# Patient Record
Sex: FEMALE | Race: WHITE
[De-identification: names, ages, dates, MRNs, and addresses within clinical notes are randomized per-mention and may not be internally consistent; named-entity substitution may affect disease eponyms.]

---

## 2018-11-06 ENCOUNTER — APPOINTMENT (OUTPATIENT)
Dept: FAMILY MEDICINE | Facility: CLINIC | Age: 48
End: 2018-11-06
Payer: COMMERCIAL

## 2018-11-06 ENCOUNTER — TRANSCRIPTION ENCOUNTER (OUTPATIENT)
Age: 48
End: 2018-11-06

## 2018-11-06 ENCOUNTER — NON-APPOINTMENT (OUTPATIENT)
Age: 48
End: 2018-11-06

## 2018-11-06 VITALS
SYSTOLIC BLOOD PRESSURE: 91 MMHG | OXYGEN SATURATION: 98 % | DIASTOLIC BLOOD PRESSURE: 57 MMHG | WEIGHT: 131 LBS | HEIGHT: 62.4 IN | TEMPERATURE: 98.9 F | BODY MASS INDEX: 23.8 KG/M2 | HEART RATE: 56 BPM

## 2018-11-06 DIAGNOSIS — Z81.8 FAMILY HISTORY OF OTHER MENTAL AND BEHAVIORAL DISORDERS: ICD-10-CM

## 2018-11-06 DIAGNOSIS — Z86.59 PERSONAL HISTORY OF OTHER MENTAL AND BEHAVIORAL DISORDERS: ICD-10-CM

## 2018-11-06 DIAGNOSIS — Z13.220 ENCOUNTER FOR SCREENING FOR LIPOID DISORDERS: ICD-10-CM

## 2018-11-06 DIAGNOSIS — Z87.891 PERSONAL HISTORY OF NICOTINE DEPENDENCE: ICD-10-CM

## 2018-11-06 DIAGNOSIS — D64.9 ANEMIA, UNSPECIFIED: ICD-10-CM

## 2018-11-06 DIAGNOSIS — Z13.1 ENCOUNTER FOR SCREENING FOR DIABETES MELLITUS: ICD-10-CM

## 2018-11-06 DIAGNOSIS — Z82.49 FAMILY HISTORY OF ISCHEMIC HEART DISEASE AND OTHER DISEASES OF THE CIRCULATORY SYSTEM: ICD-10-CM

## 2018-11-06 DIAGNOSIS — M54.5 LOW BACK PAIN: ICD-10-CM

## 2018-11-06 DIAGNOSIS — Z87.828 PERSONAL HISTORY OF OTHER (HEALED) PHYSICAL INJURY AND TRAUMA: ICD-10-CM

## 2018-11-06 DIAGNOSIS — Z78.9 OTHER SPECIFIED HEALTH STATUS: ICD-10-CM

## 2018-11-06 DIAGNOSIS — Z82.61 FAMILY HISTORY OF ARTHRITIS: ICD-10-CM

## 2018-11-06 PROCEDURE — 93000 ELECTROCARDIOGRAM COMPLETE: CPT

## 2018-11-06 PROCEDURE — 99386 PREV VISIT NEW AGE 40-64: CPT | Mod: 25

## 2018-11-06 NOTE — HISTORY OF PRESENT ILLNESS
[de-identified] : 49 yo female here for CPE and establishment of care. Feeling well today, other than fatigue. Admitting to h/o anemia in the past and is concerned this may be occurring again.

## 2018-11-06 NOTE — ASSESSMENT
[FreeTextEntry1] : CPE- Well exam, comprehensive labs ordered. Will return for PAP. Going for mammo this month.

## 2018-11-06 NOTE — HEALTH RISK ASSESSMENT
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

## 2018-11-06 NOTE — PHYSICAL EXAM

## 2018-11-09 ENCOUNTER — APPOINTMENT (OUTPATIENT)
Dept: FAMILY MEDICINE | Facility: CLINIC | Age: 48
End: 2018-11-09

## 2018-11-13 ENCOUNTER — APPOINTMENT (OUTPATIENT)
Dept: FAMILY MEDICINE | Facility: CLINIC | Age: 48
End: 2018-11-13
Payer: COMMERCIAL

## 2018-11-13 VITALS
SYSTOLIC BLOOD PRESSURE: 105 MMHG | TEMPERATURE: 98 F | BODY MASS INDEX: 24.29 KG/M2 | WEIGHT: 132 LBS | DIASTOLIC BLOOD PRESSURE: 63 MMHG | HEART RATE: 67 BPM | OXYGEN SATURATION: 98 % | HEIGHT: 62 IN

## 2018-11-13 DIAGNOSIS — B97.7 PAPILLOMAVIRUS AS THE CAUSE OF DISEASES CLASSIFIED ELSEWHERE: ICD-10-CM

## 2018-11-13 DIAGNOSIS — Z12.4 ENCOUNTER FOR SCREENING FOR MALIGNANT NEOPLASM OF CERVIX: ICD-10-CM

## 2018-11-13 PROCEDURE — 99214 OFFICE O/P EST MOD 30 MIN: CPT | Mod: 25

## 2018-11-13 PROCEDURE — 36415 COLL VENOUS BLD VENIPUNCTURE: CPT

## 2018-11-13 NOTE — HISTORY OF PRESENT ILLNESS
[de-identified] : 47 yo female here for PAP smear. Last PAP showed HPV. Sexually active with one partner, doesn't always use condoms.

## 2018-11-13 NOTE — PAST MEDICAL HISTORY
[Menstruating] : menstruating [Definite ___ (Date)] : the last menstrual period was [unfilled] [Regular Cycle Intervals] : have been regular [Total Preg ___] : G[unfilled] [AB Induced ___] : elective abortions: [unfilled]

## 2018-11-13 NOTE — PHYSICAL EXAM
[No Acute Distress] : no acute distress [Normal Sclera/Conjunctiva] : normal sclera/conjunctiva [Normal Outer Ear/Nose] : the outer ears and nose were normal in appearance [No Edema] : there was no peripheral edema [Normal Appearance] : normal in appearance [No Masses] : no palpable masses [No Nipple Discharge] : no nipple discharge [No Axillary Lymphadenopathy] : no axillary lymphadenopathy [Soft] : abdomen soft [Non Tender] : non-tender [Non-distended] : non-distended [External Female Genitalia] : normal external genitalia [Vagina] : normal vaginal exam [Cervix] : normal cervix [Uterus] : uterus was normal size, without masses or tenderness [Uterine Adnexae] : normal adnexa [No Joint Swelling] : no joint swelling [No Rash] : no rash [Normal Gait] : normal gait [Normal Insight/Judgement] : insight and judgment were intact

## 2018-11-14 ENCOUNTER — TRANSCRIPTION ENCOUNTER (OUTPATIENT)
Age: 48
End: 2018-11-14

## 2018-11-14 DIAGNOSIS — A74.9 CHLAMYDIAL INFECTION, UNSPECIFIED: ICD-10-CM

## 2018-11-14 DIAGNOSIS — H91.90 UNSPECIFIED HEARING LOSS, UNSPECIFIED EAR: ICD-10-CM

## 2018-11-14 DIAGNOSIS — Z81.8 FAMILY HISTORY OF OTHER MENTAL AND BEHAVIORAL DISORDERS: ICD-10-CM

## 2018-11-14 DIAGNOSIS — B97.7 PAPILLOMAVIRUS AS THE CAUSE OF DISEASES CLASSIFIED ELSEWHERE: ICD-10-CM

## 2018-11-14 LAB
25(OH)D3 SERPL-MCNC: 25.1 NG/ML
ALBUMIN SERPL ELPH-MCNC: 4.4 G/DL
ALP BLD-CCNC: 34 U/L
ALT SERPL-CCNC: 14 U/L
ANION GAP SERPL CALC-SCNC: 10 MMOL/L
APPEARANCE: CLEAR
AST SERPL-CCNC: 20 U/L
BACTERIA: NEGATIVE
BASOPHILS # BLD AUTO: 0.04 K/UL
BASOPHILS NFR BLD AUTO: 0.7 %
BILIRUB SERPL-MCNC: 0.3 MG/DL
BILIRUBIN URINE: NEGATIVE
BLOOD URINE: NEGATIVE
BUN SERPL-MCNC: 12 MG/DL
C TRACH RRNA SPEC QL NAA+PROBE: NOT DETECTED
C TRACH RRNA SPEC QL NAA+PROBE: NOT DETECTED
CALCIUM SERPL-MCNC: 9.4 MG/DL
CHLORIDE SERPL-SCNC: 104 MMOL/L
CHOLEST SERPL-MCNC: 137 MG/DL
CHOLEST/HDLC SERPL: 2.1 RATIO
CO2 SERPL-SCNC: 23 MMOL/L
COLOR: YELLOW
CREAT SERPL-MCNC: 0.77 MG/DL
EOSINOPHIL # BLD AUTO: 0.1 K/UL
EOSINOPHIL NFR BLD AUTO: 1.7 %
FERRITIN SERPL-MCNC: 28 NG/ML
FOLATE SERPL-MCNC: 13.2 NG/ML
GLUCOSE QUALITATIVE U: NEGATIVE MG/DL
GLUCOSE SERPL-MCNC: 71 MG/DL
HAV IGM SER QL: NONREACTIVE
HBA1C MFR BLD HPLC: 5.2 %
HBV CORE IGM SER QL: NONREACTIVE
HBV SURFACE AG SER QL: NONREACTIVE
HCT VFR BLD CALC: 36.8 %
HCV AB SER QL: NONREACTIVE
HCV S/CO RATIO: 0.14 S/CO
HDLC SERPL-MCNC: 64 MG/DL
HGB BLD-MCNC: 11.7 G/DL
HIV1+2 AB SPEC QL IA.RAPID: NONREACTIVE
IMM GRANULOCYTES NFR BLD AUTO: 0.2 %
IRON SATN MFR SERPL: 35 %
IRON SERPL-MCNC: 109 UG/DL
KETONES URINE: NEGATIVE
LDLC SERPL CALC-MCNC: 58 MG/DL
LEUKOCYTE ESTERASE URINE: NEGATIVE
LYMPHOCYTES # BLD AUTO: 1.76 K/UL
LYMPHOCYTES NFR BLD AUTO: 29.3 %
MAN DIFF?: NORMAL
MCHC RBC-ENTMCNC: 31.1 PG
MCHC RBC-ENTMCNC: 31.8 GM/DL
MCV RBC AUTO: 97.9 FL
MICROSCOPIC-UA: NORMAL
MONOCYTES # BLD AUTO: 0.65 K/UL
MONOCYTES NFR BLD AUTO: 10.8 %
N GONORRHOEA RRNA SPEC QL NAA+PROBE: NOT DETECTED
N GONORRHOEA RRNA SPEC QL NAA+PROBE: NOT DETECTED
NEUTROPHILS # BLD AUTO: 3.44 K/UL
NEUTROPHILS NFR BLD AUTO: 57.3 %
NITRITE URINE: NEGATIVE
PH URINE: 6.5
PLATELET # BLD AUTO: 323 K/UL
POTASSIUM SERPL-SCNC: 4.5 MMOL/L
PROT SERPL-MCNC: 7.1 G/DL
PROTEIN URINE: NEGATIVE MG/DL
RBC # BLD: 3.76 M/UL
RBC # FLD: 12.9 %
RED BLOOD CELLS URINE: 0 /HPF
SODIUM SERPL-SCNC: 137 MMOL/L
SOURCE AMPLIFICATION: NORMAL
SOURCE AMPLIFICATION: NORMAL
SPECIFIC GRAVITY URINE: 1.02
SQUAMOUS EPITHELIAL CELLS: 0 /HPF
T PALLIDUM AB SER QL IA: NEGATIVE
T4 FREE SERPL-MCNC: 1 NG/DL
TIBC SERPL-MCNC: 309 UG/DL
TRANSFERRIN SERPL-MCNC: 239 MG/DL
TRIGL SERPL-MCNC: 74 MG/DL
TSH SERPL-ACNC: 2.57 UIU/ML
UIBC SERPL-MCNC: 200 UG/DL
UROBILINOGEN URINE: NEGATIVE MG/DL
VIT B12 SERPL-MCNC: 336 PG/ML
WBC # FLD AUTO: 6 K/UL
WHITE BLOOD CELLS URINE: 0 /HPF

## 2018-11-15 LAB
CANDIDA VAG CYTO: NOT DETECTED
G VAGINALIS+PREV SP MTYP VAG QL MICRO: NOT DETECTED
T VAGINALIS VAG QL WET PREP: NOT DETECTED

## 2018-11-16 ENCOUNTER — TRANSCRIPTION ENCOUNTER (OUTPATIENT)
Age: 48
End: 2018-11-16

## 2018-11-19 ENCOUNTER — TRANSCRIPTION ENCOUNTER (OUTPATIENT)
Age: 48
End: 2018-11-19

## 2018-11-19 LAB
CYTOLOGY CVX/VAG DOC THIN PREP: NORMAL
HPV DNA HIGH RISK: NEGATIVE
HPV DNA LOW RISK: NEGATIVE

## 2019-01-10 ENCOUNTER — TRANSCRIPTION ENCOUNTER (OUTPATIENT)
Age: 49
End: 2019-01-10

## 2019-01-22 ENCOUNTER — APPOINTMENT (OUTPATIENT)
Dept: FAMILY MEDICINE | Facility: CLINIC | Age: 49
End: 2019-01-22
Payer: COMMERCIAL

## 2019-01-22 VITALS
HEART RATE: 55 BPM | HEIGHT: 62 IN | TEMPERATURE: 97.6 F | WEIGHT: 135 LBS | DIASTOLIC BLOOD PRESSURE: 63 MMHG | SYSTOLIC BLOOD PRESSURE: 111 MMHG | OXYGEN SATURATION: 100 % | BODY MASS INDEX: 24.84 KG/M2

## 2019-01-22 DIAGNOSIS — Z23 ENCOUNTER FOR IMMUNIZATION: ICD-10-CM

## 2019-01-22 DIAGNOSIS — Z02.1 ENCOUNTER FOR PRE-EMPLOYMENT EXAMINATION: ICD-10-CM

## 2019-01-22 PROCEDURE — 36415 COLL VENOUS BLD VENIPUNCTURE: CPT

## 2019-01-22 PROCEDURE — 90715 TDAP VACCINE 7 YRS/> IM: CPT

## 2019-01-22 PROCEDURE — 90471 IMMUNIZATION ADMIN: CPT

## 2019-01-22 PROCEDURE — 99214 OFFICE O/P EST MOD 30 MIN: CPT | Mod: 25

## 2019-01-22 NOTE — HISTORY OF PRESENT ILLNESS
[de-identified] : 47 yo female here for form completion for work, proof of negative tb, toxicology testing, and immunization evaluation.\par \par Also here for Tdap. Feeling well today, no complaints.

## 2019-01-23 ENCOUNTER — TRANSCRIPTION ENCOUNTER (OUTPATIENT)
Age: 49
End: 2019-01-23

## 2019-01-23 LAB
HBV SURFACE AB SER QL: NONREACTIVE
MEV IGG FLD QL IA: 285 AU/ML
MEV IGG+IGM SER-IMP: POSITIVE
MUV AB SER-ACNC: POSITIVE
MUV IGG SER QL IA: 30.6 AU/ML
RUBV IGG FLD-ACNC: 2 INDEX
RUBV IGG SER-IMP: POSITIVE
VZV AB TITR SER: POSITIVE
VZV IGG SER IF-ACNC: 1213 INDEX

## 2019-01-24 ENCOUNTER — TRANSCRIPTION ENCOUNTER (OUTPATIENT)
Age: 49
End: 2019-01-24

## 2019-01-24 LAB — DRUG ABUSE PANEL-9, SERUM: NORMAL

## 2019-01-25 ENCOUNTER — APPOINTMENT (OUTPATIENT)
Dept: FAMILY MEDICINE | Facility: CLINIC | Age: 49
End: 2019-01-25

## 2019-01-25 DIAGNOSIS — Z23 ENCOUNTER FOR IMMUNIZATION: ICD-10-CM

## 2019-01-25 LAB
M TB IFN-G BLD-IMP: NEGATIVE
QUANTIFERON TB PLUS MITOGEN MINUS NIL: 7.36 IU/ML
QUANTIFERON TB PLUS NIL: 0.02 IU/ML
QUANTIFERON TB PLUS TB1 MINUS NIL: 0.02 IU/ML
QUANTIFERON TB PLUS TB2 MINUS NIL: 0.02 IU/ML

## 2019-01-29 ENCOUNTER — TRANSCRIPTION ENCOUNTER (OUTPATIENT)
Age: 49
End: 2019-01-29

## 2019-01-30 LAB
AMPHET UR-MCNC: NEGATIVE
BARBITURATES UR-MCNC: NEGATIVE
BENZODIAZ UR-MCNC: NEGATIVE
COCAINE METAB.OTHER UR-MCNC: NEGATIVE
CREATININE, URINE: 28.6 MG/DL
METHADONE UR-MCNC: NEGATIVE
METHAQUALONE UR-MCNC: NEGATIVE
OPIATES UR-MCNC: NEGATIVE
PCP UR-MCNC: NEGATIVE
PROPOXYPH UR QL: NEGATIVE
THC UR QL: NEGATIVE

## 2019-01-31 ENCOUNTER — TRANSCRIPTION ENCOUNTER (OUTPATIENT)
Age: 49
End: 2019-01-31

## 2019-02-08 ENCOUNTER — TRANSCRIPTION ENCOUNTER (OUTPATIENT)
Age: 49
End: 2019-02-08

## 2019-03-02 ENCOUNTER — TRANSCRIPTION ENCOUNTER (OUTPATIENT)
Age: 49
End: 2019-03-02

## 2019-03-05 ENCOUNTER — TRANSCRIPTION ENCOUNTER (OUTPATIENT)
Age: 49
End: 2019-03-05

## 2019-07-01 ENCOUNTER — TRANSCRIPTION ENCOUNTER (OUTPATIENT)
Age: 49
End: 2019-07-01

## 2019-07-01 ENCOUNTER — MEDICATION RENEWAL (OUTPATIENT)
Age: 49
End: 2019-07-01

## 2019-08-15 ENCOUNTER — TRANSCRIPTION ENCOUNTER (OUTPATIENT)
Age: 49
End: 2019-08-15

## 2019-09-16 ENCOUNTER — APPOINTMENT (OUTPATIENT)
Dept: FAMILY MEDICINE | Facility: CLINIC | Age: 49
End: 2019-09-16
Payer: COMMERCIAL

## 2019-09-16 VITALS
SYSTOLIC BLOOD PRESSURE: 99 MMHG | WEIGHT: 131 LBS | OXYGEN SATURATION: 98 % | HEART RATE: 51 BPM | HEIGHT: 62 IN | DIASTOLIC BLOOD PRESSURE: 58 MMHG | BODY MASS INDEX: 24.11 KG/M2 | TEMPERATURE: 99.1 F

## 2019-09-16 PROCEDURE — 99214 OFFICE O/P EST MOD 30 MIN: CPT

## 2019-09-16 NOTE — PHYSICAL EXAM
[No Acute Distress] : no acute distress [Normal Sclera/Conjunctiva] : normal sclera/conjunctiva [Normal Outer Ear/Nose] : the outer ears and nose were normal in appearance [Normal Oropharynx] : the oropharynx was normal [Normal TMs] : both tympanic membranes were normal [No Respiratory Distress] : no respiratory distress  [Supple] : supple [No Accessory Muscle Use] : no accessory muscle use [Clear to Auscultation] : lungs were clear to auscultation bilaterally [Normal Rate] : normal rate  [Regular Rhythm] : with a regular rhythm [No Edema] : there was no peripheral edema [Normal S1, S2] : normal S1 and S2 [Soft] : abdomen soft [Non Tender] : non-tender [Non-distended] : non-distended [No Masses] : no abdominal mass palpated [No HSM] : no HSM [No Joint Swelling] : no joint swelling [Normal Bowel Sounds] : normal bowel sounds [No Rash] : no rash [Coordination Grossly Intact] : coordination grossly intact [Normal Insight/Judgement] : insight and judgment were intact

## 2019-09-16 NOTE — HISTORY OF PRESENT ILLNESS
[FreeTextEntry8] : 48 yo female here for follow up.\par \par Patient is concerned that she is having acid reflux. Patient noticed that she always coughs after she eats. This happens every time that she eats. Hasn't noticed any specific food triggers, could be any food. No burning sensation or feeling like the food comes up. Admitting to some phlegm, clear that comes up with the cough. No nausea, vomiting. Doesn't eat a lot of spicy foods or acidic foods. Drinks a lot of coffee, 3-4 daily. Hasn't tried any medication. No abd pain. No SOB, wheezing or cold symptoms.

## 2019-09-17 ENCOUNTER — TRANSCRIPTION ENCOUNTER (OUTPATIENT)
Age: 49
End: 2019-09-17

## 2019-09-17 RX ORDER — SPIRONOLACTONE 50 MG/1
50 TABLET ORAL
Refills: 0 | Status: ACTIVE | COMMUNITY
Start: 2019-09-17

## 2019-10-08 ENCOUNTER — APPOINTMENT (OUTPATIENT)
Dept: OTOLARYNGOLOGY | Facility: CLINIC | Age: 49
End: 2019-10-08
Payer: COMMERCIAL

## 2019-10-08 VITALS
HEART RATE: 51 BPM | DIASTOLIC BLOOD PRESSURE: 62 MMHG | BODY MASS INDEX: 24.11 KG/M2 | OXYGEN SATURATION: 98 % | SYSTOLIC BLOOD PRESSURE: 104 MMHG | TEMPERATURE: 98.5 F | HEIGHT: 62 IN | WEIGHT: 131 LBS

## 2019-10-08 PROCEDURE — 92557 COMPREHENSIVE HEARING TEST: CPT

## 2019-10-08 PROCEDURE — 99204 OFFICE O/P NEW MOD 45 MIN: CPT | Mod: 25

## 2019-10-08 PROCEDURE — 31575 DIAGNOSTIC LARYNGOSCOPY: CPT

## 2019-10-08 PROCEDURE — 92550 TYMPANOMETRY & REFLEX THRESH: CPT

## 2019-10-08 NOTE — HISTORY OF PRESENT ILLNESS
[de-identified] : Chronic cough for many years produc of scant clear mucus; this starts right after she eats. Seems to be worse after dairy & soy. Gluten free diet hasn't helped. Keeps a food journal. She thought this was d/t smoking, but the cough continued after she quit smoking 3 y/a. Denies heartburn; no improvement now w/ 3 wks of 40mg pantoprazole. \par Ears pop when swallows; denies other ear sxs. Hx BMT as child & still has occasional ear infections not rxd w/ abx. Chronic bilat mild hearing loss & occas nonlateralizing tinnitus. No pain or drg.

## 2019-10-08 NOTE — PHYSICAL EXAM
[Binocular Microscopic Exam] : Binocular microscopic exam was performed [] : septum deviated to the left [Removed] : palatine tonsils previously removed [Laryngoscopy Performed] : laryngoscopy was performed, see procedure section for findings [Normal] : orientation to person, place, and time: normal [de-identified] : myringosclerotic [de-identified] : myringosclerotic

## 2019-10-08 NOTE — PROCEDURE
[de-identified] : Indication: requirement for exam not possible via anterior examination; cough\par After verbal consent and the administration of an aerosolized phenylephrine/lidocaine mix, examination was performed with a flexible endoscope placed through the nose. Findings:\par Nasopharynx: unremarkable\par Soft palate, lateral and posterior pharyngeal walls: unremarkable\par Base of tongue & lingual tonsil: minimal retrodisplacement\par Vallecula: unremarkable\par Epiglottis: unremarkable\par Piriform sinuses and pharyngoesophageal junction: unremarkable\par Arytenoids and AE folds: mild interarytenoid edema\par Ventricle and false vocal folds: unremarkable\par True vocal folds: Smooth free edge; surface without ectasias or edema; normal movement bilaterally with good apposition in phonation\par Visible subglottis: unremarkable\par Other findings: none

## 2019-10-08 NOTE — CONSULT LETTER
[Dear  ___] : Dear  [unfilled], [Consult Letter:] : I had the pleasure of evaluating your patient, [unfilled]. [Please see my note below.] : Please see my note below. [Consult Closing:] : Thank you very much for allowing me to participate in the care of this patient.  If you have any questions, please do not hesitate to contact me. [Sincerely,] : Sincerely, [FreeTextEntry3] : CLARI Tena Jr, MD, FAAOHNS\par Otolaryngologist\par [facility]

## 2019-10-08 NOTE — ASSESSMENT
[FreeTextEntry1] : Discussed LPR vs food allergy vs other as the cause of her cough. Continue food diary; consider cough variant asthma & will defer to Dr. HANDY for possible further eval. Will try a 6 week trial of reflux tx & RTC. Reviewed reflux precautions and provided the patient with the corresponding educational handout.\par

## 2019-10-16 ENCOUNTER — MOBILE ON CALL (OUTPATIENT)
Age: 49
End: 2019-10-16

## 2019-10-16 ENCOUNTER — RX CHANGE (OUTPATIENT)
Age: 49
End: 2019-10-16

## 2019-10-16 ENCOUNTER — TRANSCRIPTION ENCOUNTER (OUTPATIENT)
Age: 49
End: 2019-10-16

## 2019-10-18 ENCOUNTER — RX RENEWAL (OUTPATIENT)
Age: 49
End: 2019-10-18

## 2019-10-27 ENCOUNTER — FORM ENCOUNTER (OUTPATIENT)
Age: 49
End: 2019-10-27

## 2019-10-28 ENCOUNTER — APPOINTMENT (OUTPATIENT)
Dept: ORTHOPEDIC SURGERY | Facility: CLINIC | Age: 49
End: 2019-10-28
Payer: COMMERCIAL

## 2019-10-28 ENCOUNTER — OUTPATIENT (OUTPATIENT)
Dept: OUTPATIENT SERVICES | Facility: HOSPITAL | Age: 49
LOS: 1 days | End: 2019-10-28
Payer: COMMERCIAL

## 2019-10-28 ENCOUNTER — APPOINTMENT (OUTPATIENT)
Dept: RADIOLOGY | Facility: CLINIC | Age: 49
End: 2019-10-28

## 2019-10-28 VITALS — WEIGHT: 131 LBS | BODY MASS INDEX: 24.11 KG/M2 | HEIGHT: 62 IN | RESPIRATION RATE: 16 BRPM

## 2019-10-28 DIAGNOSIS — M79.89 OTHER SPECIFIED SOFT TISSUE DISORDERS: ICD-10-CM

## 2019-10-28 PROCEDURE — 99205 OFFICE O/P NEW HI 60 MIN: CPT

## 2019-10-28 PROCEDURE — 73610 X-RAY EXAM OF ANKLE: CPT | Mod: 26,50

## 2019-10-28 PROCEDURE — 73630 X-RAY EXAM OF FOOT: CPT | Mod: 26,50

## 2019-10-28 PROCEDURE — 73630 X-RAY EXAM OF FOOT: CPT

## 2019-10-28 PROCEDURE — 73610 X-RAY EXAM OF ANKLE: CPT

## 2019-10-29 PROBLEM — M79.89 SWELLING OF LEFT FOOT: Status: ACTIVE | Noted: 2019-10-29

## 2019-11-05 ENCOUNTER — TRANSCRIPTION ENCOUNTER (OUTPATIENT)
Age: 49
End: 2019-11-05

## 2019-11-18 ENCOUNTER — APPOINTMENT (OUTPATIENT)
Dept: OTOLARYNGOLOGY | Facility: CLINIC | Age: 49
End: 2019-11-18

## 2019-12-13 ENCOUNTER — TRANSCRIPTION ENCOUNTER (OUTPATIENT)
Age: 49
End: 2019-12-13

## 2019-12-13 ENCOUNTER — MEDICATION RENEWAL (OUTPATIENT)
Age: 49
End: 2019-12-13

## 2019-12-19 ENCOUNTER — TRANSCRIPTION ENCOUNTER (OUTPATIENT)
Age: 49
End: 2019-12-19

## 2019-12-20 ENCOUNTER — TRANSCRIPTION ENCOUNTER (OUTPATIENT)
Age: 49
End: 2019-12-20

## 2020-01-13 ENCOUNTER — TRANSCRIPTION ENCOUNTER (OUTPATIENT)
Age: 50
End: 2020-01-13

## 2020-01-14 ENCOUNTER — TRANSCRIPTION ENCOUNTER (OUTPATIENT)
Age: 50
End: 2020-01-14

## 2020-01-15 ENCOUNTER — TRANSCRIPTION ENCOUNTER (OUTPATIENT)
Age: 50
End: 2020-01-15

## 2020-01-21 ENCOUNTER — FORM ENCOUNTER (OUTPATIENT)
Age: 50
End: 2020-01-21

## 2020-01-22 ENCOUNTER — APPOINTMENT (OUTPATIENT)
Dept: ULTRASOUND IMAGING | Facility: CLINIC | Age: 50
End: 2020-01-22
Payer: COMMERCIAL

## 2020-01-22 ENCOUNTER — APPOINTMENT (OUTPATIENT)
Dept: MAMMOGRAPHY | Facility: CLINIC | Age: 50
End: 2020-01-22
Payer: COMMERCIAL

## 2020-01-22 ENCOUNTER — OUTPATIENT (OUTPATIENT)
Dept: OUTPATIENT SERVICES | Facility: HOSPITAL | Age: 50
LOS: 1 days | End: 2020-01-22

## 2020-01-22 PROCEDURE — 77063 BREAST TOMOSYNTHESIS BI: CPT | Mod: 26

## 2020-01-22 PROCEDURE — 76641 ULTRASOUND BREAST COMPLETE: CPT | Mod: 26,50

## 2020-01-22 PROCEDURE — 77067 SCR MAMMO BI INCL CAD: CPT | Mod: 26

## 2020-01-24 ENCOUNTER — APPOINTMENT (OUTPATIENT)
Dept: ORTHOPEDIC SURGERY | Facility: CLINIC | Age: 50
End: 2020-01-24
Payer: COMMERCIAL

## 2020-01-24 VITALS — WEIGHT: 131 LBS | RESPIRATION RATE: 16 BRPM | BODY MASS INDEX: 24.11 KG/M2 | HEIGHT: 62 IN

## 2020-01-24 DIAGNOSIS — M25.571 PAIN IN RIGHT ANKLE AND JOINTS OF RIGHT FOOT: ICD-10-CM

## 2020-01-24 PROCEDURE — 99213 OFFICE O/P EST LOW 20 MIN: CPT

## 2020-02-27 ENCOUNTER — APPOINTMENT (OUTPATIENT)
Dept: INTERNAL MEDICINE | Facility: CLINIC | Age: 50
End: 2020-02-27
Payer: COMMERCIAL

## 2020-02-27 VITALS
SYSTOLIC BLOOD PRESSURE: 110 MMHG | HEIGHT: 62 IN | BODY MASS INDEX: 24.29 KG/M2 | TEMPERATURE: 97.9 F | WEIGHT: 132 LBS | DIASTOLIC BLOOD PRESSURE: 66 MMHG | OXYGEN SATURATION: 98 % | HEART RATE: 61 BPM

## 2020-02-27 DIAGNOSIS — Z11.1 ENCOUNTER FOR SCREENING FOR RESPIRATORY TUBERCULOSIS: ICD-10-CM

## 2020-02-27 PROCEDURE — 99214 OFFICE O/P EST MOD 30 MIN: CPT

## 2020-03-02 LAB
M TB IFN-G BLD-IMP: NEGATIVE
QUANTIFERON TB PLUS MITOGEN MINUS NIL: 7.76 IU/ML
QUANTIFERON TB PLUS NIL: 0.01 IU/ML
QUANTIFERON TB PLUS TB1 MINUS NIL: 0.01 IU/ML
QUANTIFERON TB PLUS TB2 MINUS NIL: 0 IU/ML

## 2020-04-29 ENCOUNTER — TRANSCRIPTION ENCOUNTER (OUTPATIENT)
Age: 50
End: 2020-04-29

## 2020-04-29 NOTE — PHYSICAL EXAM
[Normal] : no acute distress, well nourished, well developed and well-appearing [Alert and Oriented x3] : oriented to person, place, and time [de-identified] : somewhat blunted affect

## 2020-04-29 NOTE — PLAN
[FreeTextEntry1] : She is already on SSRI. We discussed increasing dose vs. adding another agent (more likely to have effect). Will add daily wellbutrin to hopefully help with energy/anhedonia. Recommended taking in AM, discussed that it could cause irritability/palpitations/jitteriness.

## 2020-04-29 NOTE — HISTORY OF PRESENT ILLNESS
[de-identified] : some days can't get out of bed, because depressed and anxiety.  On citalopram 20 currently (started on 10 mg 2016).\par \par Mother committed suicide, sister is suicidal, often gets very overwhelmed after speaking with sister and just sleeps the rest of the day

## 2020-05-12 ENCOUNTER — TRANSCRIPTION ENCOUNTER (OUTPATIENT)
Age: 50
End: 2020-05-12

## 2020-05-21 ENCOUNTER — APPOINTMENT (OUTPATIENT)
Dept: FAMILY MEDICINE | Facility: CLINIC | Age: 50
End: 2020-05-21
Payer: COMMERCIAL

## 2020-05-21 VITALS
OXYGEN SATURATION: 98 % | TEMPERATURE: 98.3 F | HEART RATE: 68 BPM | SYSTOLIC BLOOD PRESSURE: 112 MMHG | DIASTOLIC BLOOD PRESSURE: 67 MMHG | BODY MASS INDEX: 23.92 KG/M2 | WEIGHT: 130 LBS | HEIGHT: 62 IN

## 2020-05-21 DIAGNOSIS — L72.9 FOLLICULAR CYST OF THE SKIN AND SUBCUTANEOUS TISSUE, UNSPECIFIED: ICD-10-CM

## 2020-05-21 DIAGNOSIS — Z11.59 ENCOUNTER FOR SCREENING FOR OTHER VIRAL DISEASES: ICD-10-CM

## 2020-05-21 DIAGNOSIS — Z71.89 OTHER SPECIFIED COUNSELING: ICD-10-CM

## 2020-05-21 PROCEDURE — 99214 OFFICE O/P EST MOD 30 MIN: CPT | Mod: 25

## 2020-05-21 PROCEDURE — 36415 COLL VENOUS BLD VENIPUNCTURE: CPT

## 2020-05-21 NOTE — PHYSICAL EXAM
[No Acute Distress] : no acute distress [Normal Sclera/Conjunctiva] : normal sclera/conjunctiva [Normal Outer Ear/Nose] : the outer ears and nose were normal in appearance [Supple] : supple [No Respiratory Distress] : no respiratory distress  [No Edema] : there was no peripheral edema [Normal Gait] : normal gait [Normal Insight/Judgement] : insight and judgment were intact [de-identified] : small ~ 1 cm erythematous cyst vs folliculitis on left vulva, non fluctuant

## 2020-05-21 NOTE — HISTORY OF PRESENT ILLNESS
[FreeTextEntry8] : 50 yo female here for form completion for school. Had titers this past year. Feeling well today.\par \par Also with cyst on vulva. Some discomfort with this. Has had in the past and had to have excised. Has been going on for about a month. MInimal pain, no improvement. \par \par Would like COVID ab testing today as well.

## 2020-05-22 ENCOUNTER — TRANSCRIPTION ENCOUNTER (OUTPATIENT)
Age: 50
End: 2020-05-22

## 2020-05-22 LAB
SARS-COV-2 IGG SERPL IA-ACNC: 0 INDEX
SARS-COV-2 IGG SERPL QL IA: NEGATIVE

## 2020-05-25 ENCOUNTER — TRANSCRIPTION ENCOUNTER (OUTPATIENT)
Age: 50
End: 2020-05-25

## 2020-05-26 ENCOUNTER — TRANSCRIPTION ENCOUNTER (OUTPATIENT)
Age: 50
End: 2020-05-26

## 2020-05-29 ENCOUNTER — TRANSCRIPTION ENCOUNTER (OUTPATIENT)
Age: 50
End: 2020-05-29

## 2020-06-01 ENCOUNTER — TRANSCRIPTION ENCOUNTER (OUTPATIENT)
Age: 50
End: 2020-06-01

## 2020-06-15 ENCOUNTER — APPOINTMENT (OUTPATIENT)
Dept: OBGYN | Facility: CLINIC | Age: 50
End: 2020-06-15
Payer: COMMERCIAL

## 2020-06-15 VITALS
DIASTOLIC BLOOD PRESSURE: 70 MMHG | HEIGHT: 63 IN | BODY MASS INDEX: 23.57 KG/M2 | WEIGHT: 133 LBS | SYSTOLIC BLOOD PRESSURE: 110 MMHG

## 2020-06-15 DIAGNOSIS — Z01.419 ENCOUNTER FOR GYNECOLOGICAL EXAMINATION (GENERAL) (ROUTINE) W/OUT ABNORMAL FINDINGS: ICD-10-CM

## 2020-06-15 PROCEDURE — 36415 COLL VENOUS BLD VENIPUNCTURE: CPT

## 2020-06-15 PROCEDURE — 99203 OFFICE O/P NEW LOW 30 MIN: CPT

## 2020-06-15 NOTE — PHYSICAL EXAM
[Mass] : breast mass [Tender] : tenderness [Axillary LAD] : no axillary lymphadenopathy [Nipple Discharge] : no nipple discharge [Breast Mass Right Breast ___cm] : no was mass palpable [___cm] : a ~M [unfilled] ~Ucm superior lateral quadrant mass was palpated

## 2020-06-16 LAB
HBV SURFACE AB SER QL: REACTIVE
HBV SURFACE AG SER QL: NONREACTIVE
HCV AB SER QL: NONREACTIVE
HCV S/CO RATIO: 0.08 S/CO
HIV1+2 AB SPEC QL IA.RAPID: NONREACTIVE
T PALLIDUM AB SER QL IA: NEGATIVE

## 2020-06-17 ENCOUNTER — OUTPATIENT (OUTPATIENT)
Dept: OUTPATIENT SERVICES | Facility: HOSPITAL | Age: 50
LOS: 1 days | End: 2020-06-17

## 2020-06-17 ENCOUNTER — APPOINTMENT (OUTPATIENT)
Dept: MAMMOGRAPHY | Facility: CLINIC | Age: 50
End: 2020-06-17
Payer: COMMERCIAL

## 2020-06-17 ENCOUNTER — APPOINTMENT (OUTPATIENT)
Dept: ULTRASOUND IMAGING | Facility: CLINIC | Age: 50
End: 2020-06-17
Payer: COMMERCIAL

## 2020-06-17 LAB
C TRACH RRNA SPEC QL NAA+PROBE: NOT DETECTED
N GONORRHOEA RRNA SPEC QL NAA+PROBE: NOT DETECTED
SOURCE AMPLIFICATION: NORMAL

## 2020-06-17 PROCEDURE — 77065 DX MAMMO INCL CAD UNI: CPT | Mod: 26,LT

## 2020-06-17 PROCEDURE — 76642 ULTRASOUND BREAST LIMITED: CPT | Mod: 26,LT

## 2020-06-17 PROCEDURE — G0279: CPT | Mod: 26

## 2020-06-22 ENCOUNTER — APPOINTMENT (OUTPATIENT)
Dept: OBGYN | Facility: CLINIC | Age: 50
End: 2020-06-22
Payer: COMMERCIAL

## 2020-06-22 PROCEDURE — 99396 PREV VISIT EST AGE 40-64: CPT

## 2020-06-22 NOTE — PHYSICAL EXAM
[Normal] : uterus [No Bleeding] : there was no active vaginal bleeding [Pap Obtained] : a Pap smear was performed [Uterine Adnexae] : were not tender and not enlarged

## 2020-06-22 NOTE — HISTORY OF PRESENT ILLNESS
[Definite:  ___ (Date)] : the last menstrual period was [unfilled] [Normal Amount/Duration] : was of a normal amount and duration [Regular Cycle Intervals] : periods have been regular [Spotting Between  Menses] : no spotting between menses

## 2020-06-23 LAB — HPV HIGH+LOW RISK DNA PNL CVX: NOT DETECTED

## 2020-06-24 ENCOUNTER — TRANSCRIPTION ENCOUNTER (OUTPATIENT)
Age: 50
End: 2020-06-24

## 2020-06-26 ENCOUNTER — TRANSCRIPTION ENCOUNTER (OUTPATIENT)
Age: 50
End: 2020-06-26

## 2020-07-23 ENCOUNTER — TRANSCRIPTION ENCOUNTER (OUTPATIENT)
Age: 50
End: 2020-07-23

## 2020-08-03 ENCOUNTER — TRANSCRIPTION ENCOUNTER (OUTPATIENT)
Age: 50
End: 2020-08-03

## 2020-10-28 ENCOUNTER — APPOINTMENT (OUTPATIENT)
Dept: ORTHOPEDIC SURGERY | Facility: CLINIC | Age: 50
End: 2020-10-28
Payer: COMMERCIAL

## 2020-10-28 DIAGNOSIS — M25.80 OTHER SPECIFIED JOINT DISORDERS, UNSPECIFIED JOINT: ICD-10-CM

## 2020-10-28 DIAGNOSIS — M24.271 DISORDER OF LIGAMENT, RIGHT ANKLE: ICD-10-CM

## 2020-10-28 DIAGNOSIS — G57.91 UNSPECIFIED MONONEUROPATHY OF RIGHT LOWER LIMB: ICD-10-CM

## 2020-10-28 DIAGNOSIS — M76.71 PERONEAL TENDINITIS, RIGHT LEG: ICD-10-CM

## 2020-10-28 DIAGNOSIS — M21.6X1 OTHER ACQUIRED DEFORMITIES OF RIGHT FOOT: ICD-10-CM

## 2020-10-28 PROCEDURE — 77002 NEEDLE LOCALIZATION BY XRAY: CPT | Mod: RT

## 2020-10-28 PROCEDURE — 20600 DRAIN/INJ JOINT/BURSA W/O US: CPT | Mod: RT

## 2020-10-28 PROCEDURE — 99214 OFFICE O/P EST MOD 30 MIN: CPT | Mod: 25

## 2020-10-28 PROCEDURE — 99072 ADDL SUPL MATRL&STAF TM PHE: CPT

## 2020-11-09 ENCOUNTER — TRANSCRIPTION ENCOUNTER (OUTPATIENT)
Age: 50
End: 2020-11-09

## 2020-11-23 ENCOUNTER — TRANSCRIPTION ENCOUNTER (OUTPATIENT)
Age: 50
End: 2020-11-23

## 2020-12-23 ENCOUNTER — TRANSCRIPTION ENCOUNTER (OUTPATIENT)
Age: 50
End: 2020-12-23

## 2020-12-23 PROBLEM — Z01.419 ENCOUNTER FOR ANNUAL ROUTINE GYNECOLOGICAL EXAMINATION: Status: RESOLVED | Noted: 2020-06-15 | Resolved: 2020-12-23

## 2021-03-02 ENCOUNTER — APPOINTMENT (OUTPATIENT)
Dept: FAMILY MEDICINE | Facility: CLINIC | Age: 51
End: 2021-03-02
Payer: COMMERCIAL

## 2021-03-02 ENCOUNTER — NON-APPOINTMENT (OUTPATIENT)
Age: 51
End: 2021-03-02

## 2021-03-02 VITALS
TEMPERATURE: 96.5 F | DIASTOLIC BLOOD PRESSURE: 66 MMHG | HEART RATE: 66 BPM | BODY MASS INDEX: 22.86 KG/M2 | WEIGHT: 129 LBS | OXYGEN SATURATION: 97 % | SYSTOLIC BLOOD PRESSURE: 106 MMHG | HEIGHT: 63 IN | RESPIRATION RATE: 16 BRPM

## 2021-03-02 DIAGNOSIS — L29.0 PRURITUS ANI: ICD-10-CM

## 2021-03-02 DIAGNOSIS — R05 COUGH: ICD-10-CM

## 2021-03-02 PROCEDURE — 36415 COLL VENOUS BLD VENIPUNCTURE: CPT

## 2021-03-02 PROCEDURE — 99214 OFFICE O/P EST MOD 30 MIN: CPT | Mod: 25

## 2021-03-02 PROCEDURE — 99072 ADDL SUPL MATRL&STAF TM PHE: CPT

## 2021-03-02 PROCEDURE — 93000 ELECTROCARDIOGRAM COMPLETE: CPT

## 2021-03-02 PROCEDURE — 99396 PREV VISIT EST AGE 40-64: CPT | Mod: 25

## 2021-03-02 RX ORDER — PANTOPRAZOLE 40 MG/1
40 TABLET, DELAYED RELEASE ORAL DAILY
Qty: 30 | Refills: 0 | Status: DISCONTINUED | COMMUNITY
Start: 2019-09-16 | End: 2021-03-02

## 2021-03-02 RX ORDER — FAMOTIDINE 40 MG/1
40 TABLET, FILM COATED ORAL
Qty: 30 | Refills: 5 | Status: DISCONTINUED | COMMUNITY
Start: 2019-10-16 | End: 2021-03-02

## 2021-03-02 RX ORDER — MELOXICAM 15 MG/1
15 TABLET ORAL
Qty: 90 | Refills: 0 | Status: DISCONTINUED | COMMUNITY
Start: 2019-10-28 | End: 2021-03-02

## 2021-03-02 NOTE — PHYSICAL EXAM
[No Acute Distress] : no acute distress [Well Nourished] : well nourished [Well Developed] : well developed [Well-Appearing] : well-appearing [Normal Sclera/Conjunctiva] : normal sclera/conjunctiva [PERRL] : pupils equal round and reactive to light [EOMI] : extraocular movements intact [Normal Outer Ear/Nose] : the outer ears and nose were normal in appearance [Normal Oropharynx] : the oropharynx was normal [No JVD] : no jugular venous distention [No Lymphadenopathy] : no lymphadenopathy [Supple] : supple [Thyroid Normal, No Nodules] : the thyroid was normal and there were no nodules present [No Respiratory Distress] : no respiratory distress  [No Accessory Muscle Use] : no accessory muscle use [Clear to Auscultation] : lungs were clear to auscultation bilaterally [Normal Rate] : normal rate  [Regular Rhythm] : with a regular rhythm [Normal S1, S2] : normal S1 and S2 [No Murmur] : no murmur heard [No Abdominal Bruit] : a ~M bruit was not heard ~T in the abdomen [No Varicosities] : no varicosities [No Edema] : there was no peripheral edema [No Palpable Aorta] : no palpable aorta [No Extremity Clubbing/Cyanosis] : no extremity clubbing/cyanosis [Soft] : abdomen soft [Non Tender] : non-tender [Non-distended] : non-distended [No Masses] : no abdominal mass palpated [No HSM] : no HSM [Normal Bowel Sounds] : normal bowel sounds [Normal Sphincter Tone] : normal sphincter tone [No Mass] : no mass [Normal Posterior Cervical Nodes] : no posterior cervical lymphadenopathy [Normal Anterior Cervical Nodes] : no anterior cervical lymphadenopathy [No CVA Tenderness] : no CVA  tenderness [No Spinal Tenderness] : no spinal tenderness [No Joint Swelling] : no joint swelling [Grossly Normal Strength/Tone] : grossly normal strength/tone [No Rash] : no rash [Coordination Grossly Intact] : coordination grossly intact [No Focal Deficits] : no focal deficits [Normal Gait] : normal gait [Deep Tendon Reflexes (DTR)] : deep tendon reflexes were 2+ and symmetric [Normal Affect] : the affect was normal [Normal Insight/Judgement] : insight and judgment were intact [FreeTextEntry1] : small hemorrhoid, 6 o clock, non bleeding

## 2021-03-02 NOTE — HEALTH RISK ASSESSMENT
[Good] : ~his/her~  mood as  good [0] : 2) Feeling down, depressed, or hopeless: Not at all (0) [Patient reported mammogram was normal] : Patient reported mammogram was normal [HIV Test offered] : HIV Test offered [Hepatitis C test offered] : Hepatitis C test offered [Alone] : lives alone [Employed] : employed [Student] : student [Single] : single [MammogramDate] : 01/20

## 2021-03-02 NOTE — ASSESSMENT
[FreeTextEntry1] : CPE- Well exam, comprehensive labs ordered. STD panel sent. PAP with GYN. F/u labs. Mammo script given

## 2021-03-02 NOTE — HISTORY OF PRESENT ILLNESS
[de-identified] : 49 yo female here for CPE. \par \par C/o rectal discomfort and itching on and off. Has improvement when she showers regularly, worse if she skips a few days. No rectal bleeding. No h/o hemorrhoids. Only using powder so far with some help. \par \par Still with cough, diagnosed with GERD by ENT. Hasn't adhered to GERD diet or take pepcid.

## 2021-03-03 LAB
APPEARANCE: CLEAR
BACTERIA: NEGATIVE
BASOPHILS # BLD AUTO: 0.07 K/UL
BASOPHILS NFR BLD AUTO: 1 %
BILIRUBIN URINE: NEGATIVE
BLOOD URINE: NEGATIVE
COLOR: YELLOW
EOSINOPHIL # BLD AUTO: 0.09 K/UL
EOSINOPHIL NFR BLD AUTO: 1.2 %
FERRITIN SERPL-MCNC: 42 NG/ML
GLUCOSE QUALITATIVE U: NEGATIVE
HCT VFR BLD CALC: 35.4 %
HGB BLD-MCNC: 11.5 G/DL
HIV1+2 AB SPEC QL IA.RAPID: NONREACTIVE
HYALINE CASTS: 0 /LPF
IMM GRANULOCYTES NFR BLD AUTO: 0.3 %
IRON SATN MFR SERPL: 48 %
IRON SERPL-MCNC: 151 UG/DL
KETONES URINE: NEGATIVE
LEUKOCYTE ESTERASE URINE: NEGATIVE
LYMPHOCYTES # BLD AUTO: 1.57 K/UL
LYMPHOCYTES NFR BLD AUTO: 21.3 %
MAN DIFF?: NORMAL
MCHC RBC-ENTMCNC: 32.3 PG
MCHC RBC-ENTMCNC: 32.5 GM/DL
MCV RBC AUTO: 99.4 FL
MICROSCOPIC-UA: NORMAL
MONOCYTES # BLD AUTO: 0.67 K/UL
MONOCYTES NFR BLD AUTO: 9.1 %
NEUTROPHILS # BLD AUTO: 4.94 K/UL
NEUTROPHILS NFR BLD AUTO: 67.1 %
NITRITE URINE: NEGATIVE
PH URINE: 6.5
PLATELET # BLD AUTO: 377 K/UL
PROTEIN URINE: NEGATIVE
RBC # BLD: 3.56 M/UL
RBC # FLD: 12 %
RED BLOOD CELLS URINE: 2 /HPF
SPECIFIC GRAVITY URINE: 1.02
SQUAMOUS EPITHELIAL CELLS: 6 /HPF
TIBC SERPL-MCNC: 314 UG/DL
TRANSFERRIN SERPL-MCNC: 255 MG/DL
UIBC SERPL-MCNC: 163 UG/DL
UROBILINOGEN URINE: NORMAL
WBC # FLD AUTO: 7.36 K/UL
WHITE BLOOD CELLS URINE: 1 /HPF

## 2021-03-04 ENCOUNTER — TRANSCRIPTION ENCOUNTER (OUTPATIENT)
Age: 51
End: 2021-03-04

## 2021-03-05 ENCOUNTER — TRANSCRIPTION ENCOUNTER (OUTPATIENT)
Age: 51
End: 2021-03-05

## 2021-03-05 LAB
25(OH)D3 SERPL-MCNC: 28.6 NG/ML
ALBUMIN SERPL ELPH-MCNC: 4.3 G/DL
ALP BLD-CCNC: 40 U/L
ALT SERPL-CCNC: 37 U/L
ANION GAP SERPL CALC-SCNC: 11 MMOL/L
AST SERPL-CCNC: 30 U/L
BILIRUB SERPL-MCNC: 0.4 MG/DL
BUN SERPL-MCNC: 17 MG/DL
C TRACH RRNA SPEC QL NAA+PROBE: NOT DETECTED
CALCIUM SERPL-MCNC: 9.4 MG/DL
CHLORIDE SERPL-SCNC: 102 MMOL/L
CHOLEST SERPL-MCNC: 158 MG/DL
CO2 SERPL-SCNC: 23 MMOL/L
CREAT SERPL-MCNC: 0.92 MG/DL
ESTIMATED AVERAGE GLUCOSE: 100 MG/DL
FOLATE SERPL-MCNC: 11.9 NG/ML
GLUCOSE SERPL-MCNC: 93 MG/DL
HAV IGM SER QL: NONREACTIVE
HBA1C MFR BLD HPLC: 5.1 %
HBV CORE IGM SER QL: NONREACTIVE
HBV SURFACE AG SER QL: NONREACTIVE
HCV AB SER QL: NONREACTIVE
HCV S/CO RATIO: 0.07 S/CO
HDLC SERPL-MCNC: 68 MG/DL
LDLC SERPL CALC-MCNC: 69 MG/DL
N GONORRHOEA RRNA SPEC QL NAA+PROBE: NOT DETECTED
NONHDLC SERPL-MCNC: 90 MG/DL
POTASSIUM SERPL-SCNC: 5 MMOL/L
PROT SERPL-MCNC: 6.7 G/DL
SODIUM SERPL-SCNC: 136 MMOL/L
SOURCE AMPLIFICATION: NORMAL
T PALLIDUM AB SER QL IA: NEGATIVE
T4 FREE SERPL-MCNC: 0.9 NG/DL
TRIGL SERPL-MCNC: 103 MG/DL
TSH SERPL-ACNC: 2.82 UIU/ML
VIT B12 SERPL-MCNC: 380 PG/ML

## 2021-03-08 ENCOUNTER — TRANSCRIPTION ENCOUNTER (OUTPATIENT)
Age: 51
End: 2021-03-08

## 2021-04-16 ENCOUNTER — RX RENEWAL (OUTPATIENT)
Age: 51
End: 2021-04-16

## 2021-06-25 ENCOUNTER — TRANSCRIPTION ENCOUNTER (OUTPATIENT)
Age: 51
End: 2021-06-25

## 2021-07-12 ENCOUNTER — RX RENEWAL (OUTPATIENT)
Age: 51
End: 2021-07-12

## 2021-07-21 ENCOUNTER — RESULT REVIEW (OUTPATIENT)
Age: 51
End: 2021-07-21

## 2021-07-21 ENCOUNTER — APPOINTMENT (OUTPATIENT)
Dept: ULTRASOUND IMAGING | Facility: CLINIC | Age: 51
End: 2021-07-21
Payer: COMMERCIAL

## 2021-07-21 ENCOUNTER — OUTPATIENT (OUTPATIENT)
Dept: OUTPATIENT SERVICES | Facility: HOSPITAL | Age: 51
LOS: 1 days | End: 2021-07-21

## 2021-07-21 ENCOUNTER — APPOINTMENT (OUTPATIENT)
Dept: MAMMOGRAPHY | Facility: CLINIC | Age: 51
End: 2021-07-21
Payer: COMMERCIAL

## 2021-07-21 ENCOUNTER — TRANSCRIPTION ENCOUNTER (OUTPATIENT)
Age: 51
End: 2021-07-21

## 2021-07-21 PROCEDURE — 76641 ULTRASOUND BREAST COMPLETE: CPT | Mod: 26,50

## 2021-07-21 PROCEDURE — 77067 SCR MAMMO BI INCL CAD: CPT | Mod: 26

## 2021-07-21 PROCEDURE — 77063 BREAST TOMOSYNTHESIS BI: CPT | Mod: 26

## 2021-08-27 ENCOUNTER — TRANSCRIPTION ENCOUNTER (OUTPATIENT)
Age: 51
End: 2021-08-27

## 2021-10-17 ENCOUNTER — TRANSCRIPTION ENCOUNTER (OUTPATIENT)
Age: 51
End: 2021-10-17

## 2021-11-15 ENCOUNTER — RX RENEWAL (OUTPATIENT)
Age: 51
End: 2021-11-15

## 2021-12-16 ENCOUNTER — RX RENEWAL (OUTPATIENT)
Age: 51
End: 2021-12-16

## 2021-12-16 ENCOUNTER — TRANSCRIPTION ENCOUNTER (OUTPATIENT)
Age: 51
End: 2021-12-16

## 2021-12-17 ENCOUNTER — TRANSCRIPTION ENCOUNTER (OUTPATIENT)
Age: 51
End: 2021-12-17

## 2021-12-22 ENCOUNTER — EMERGENCY (EMERGENCY)
Facility: HOSPITAL | Age: 51
LOS: 1 days | Discharge: HOME | End: 2021-12-22
Admitting: EMERGENCY MEDICINE
Payer: COMMERCIAL

## 2021-12-22 VITALS
SYSTOLIC BLOOD PRESSURE: 114 MMHG | OXYGEN SATURATION: 97 % | HEIGHT: 63 IN | WEIGHT: 130.07 LBS | RESPIRATION RATE: 18 BRPM | HEART RATE: 58 BPM | DIASTOLIC BLOOD PRESSURE: 59 MMHG | TEMPERATURE: 99 F

## 2021-12-22 VITALS
HEART RATE: 62 BPM | RESPIRATION RATE: 16 BRPM | OXYGEN SATURATION: 99 % | SYSTOLIC BLOOD PRESSURE: 102 MMHG | DIASTOLIC BLOOD PRESSURE: 66 MMHG

## 2021-12-22 DIAGNOSIS — R07.89 OTHER CHEST PAIN: ICD-10-CM

## 2021-12-22 DIAGNOSIS — Z88.5 ALLERGY STATUS TO NARCOTIC AGENT: ICD-10-CM

## 2021-12-22 LAB
ANION GAP SERPL CALC-SCNC: 7 MMOL/L — LOW (ref 9–16)
BASOPHILS # BLD AUTO: 0.04 K/UL — SIGNIFICANT CHANGE UP (ref 0–0.2)
BASOPHILS NFR BLD AUTO: 0.7 % — SIGNIFICANT CHANGE UP (ref 0–2)
BUN SERPL-MCNC: 12 MG/DL — SIGNIFICANT CHANGE UP (ref 7–23)
CALCIUM SERPL-MCNC: 8.8 MG/DL — SIGNIFICANT CHANGE UP (ref 8.5–10.5)
CHLORIDE SERPL-SCNC: 105 MMOL/L — SIGNIFICANT CHANGE UP (ref 96–108)
CO2 SERPL-SCNC: 26 MMOL/L — SIGNIFICANT CHANGE UP (ref 22–31)
CREAT SERPL-MCNC: 0.78 MG/DL — SIGNIFICANT CHANGE UP (ref 0.5–1.3)
D DIMER BLD IA.RAPID-MCNC: <187 NG/ML DDU — SIGNIFICANT CHANGE UP
EOSINOPHIL # BLD AUTO: 0.07 K/UL — SIGNIFICANT CHANGE UP (ref 0–0.5)
EOSINOPHIL NFR BLD AUTO: 1.3 % — SIGNIFICANT CHANGE UP (ref 0–6)
GLUCOSE SERPL-MCNC: 86 MG/DL — SIGNIFICANT CHANGE UP (ref 70–99)
HCT VFR BLD CALC: 32.9 % — LOW (ref 34.5–45)
HGB BLD-MCNC: 10.9 G/DL — LOW (ref 11.5–15.5)
IMM GRANULOCYTES NFR BLD AUTO: 0.2 % — SIGNIFICANT CHANGE UP (ref 0–1.5)
LYMPHOCYTES # BLD AUTO: 1.73 K/UL — SIGNIFICANT CHANGE UP (ref 1–3.3)
LYMPHOCYTES # BLD AUTO: 31.6 % — SIGNIFICANT CHANGE UP (ref 13–44)
MANUAL SMEAR VERIFICATION: SIGNIFICANT CHANGE UP
MCHC RBC-ENTMCNC: 32.4 PG — SIGNIFICANT CHANGE UP (ref 27–34)
MCHC RBC-ENTMCNC: 33.1 GM/DL — SIGNIFICANT CHANGE UP (ref 32–36)
MCV RBC AUTO: 97.9 FL — SIGNIFICANT CHANGE UP (ref 80–100)
MONOCYTES # BLD AUTO: 0.52 K/UL — SIGNIFICANT CHANGE UP (ref 0–0.9)
MONOCYTES NFR BLD AUTO: 9.5 % — SIGNIFICANT CHANGE UP (ref 2–14)
NEUTROPHILS # BLD AUTO: 3.11 K/UL — SIGNIFICANT CHANGE UP (ref 1.8–7.4)
NEUTROPHILS NFR BLD AUTO: 56.7 % — SIGNIFICANT CHANGE UP (ref 43–77)
NRBC # BLD: 0 /100 WBCS — SIGNIFICANT CHANGE UP (ref 0–0)
PLAT MORPH BLD: NORMAL — SIGNIFICANT CHANGE UP
PLATELET # BLD AUTO: 268 K/UL — SIGNIFICANT CHANGE UP (ref 150–400)
PLATELET COUNT - ESTIMATE: NORMAL — SIGNIFICANT CHANGE UP
POTASSIUM SERPL-MCNC: 4.5 MMOL/L — SIGNIFICANT CHANGE UP (ref 3.5–5.3)
POTASSIUM SERPL-SCNC: 4.5 MMOL/L — SIGNIFICANT CHANGE UP (ref 3.5–5.3)
RBC # BLD: 3.36 M/UL — LOW (ref 3.8–5.2)
RBC # FLD: 11.9 % — SIGNIFICANT CHANGE UP (ref 10.3–14.5)
RBC BLD AUTO: NORMAL — SIGNIFICANT CHANGE UP
SODIUM SERPL-SCNC: 138 MMOL/L — SIGNIFICANT CHANGE UP (ref 132–145)
TROPONIN I, HIGH SENSITIVITY RESULT: 4.9 NG/L — SIGNIFICANT CHANGE UP
TROPONIN I, HIGH SENSITIVITY RESULT: 5.3 NG/L — SIGNIFICANT CHANGE UP
WBC # BLD: 5.48 K/UL — SIGNIFICANT CHANGE UP (ref 3.8–10.5)
WBC # FLD AUTO: 5.48 K/UL — SIGNIFICANT CHANGE UP (ref 3.8–10.5)

## 2021-12-22 PROCEDURE — 93010 ELECTROCARDIOGRAM REPORT: CPT

## 2021-12-22 PROCEDURE — 99284 EMERGENCY DEPT VISIT MOD MDM: CPT | Mod: 25

## 2021-12-22 PROCEDURE — 70360 X-RAY EXAM OF NECK: CPT | Mod: 26

## 2021-12-22 PROCEDURE — 71046 X-RAY EXAM CHEST 2 VIEWS: CPT | Mod: 26

## 2021-12-22 NOTE — ED ADULT NURSE NOTE - OBJECTIVE STATEMENT
Chest pressure/tightness since yesterday, non exertional. Had dental procedure. Not on hormonal birth control, no long travel, no LE pain/swelling.

## 2021-12-22 NOTE — ED PROVIDER NOTE - CLINICAL SUMMARY MEDICAL DECISION MAKING FREE TEXT BOX
DDx: ACS vs PE vs MSK pain. Will order EKG, labs including cardiac enzymes, soft tissue neck xray and CXR; +/- CT chest pending d-dimer score. Will reassess. DDx: ACS vs PE vs MSK pain. Pt is PERC score neg. Will order EKG, labs including cardiac enzymes, soft tissue neck xray and CXR; +/- CT chest pending d-dimer score. Will reassess.

## 2021-12-22 NOTE — ED ADULT NURSE NOTE - CAS ELECT INFOMATION PROVIDED
Dr. Sandra Maya discussed with patient at [facility].
results reviewed with pt by provider Marium watkins

## 2021-12-22 NOTE — ED PROVIDER NOTE - MUSCULOSKELETAL, MLM
Spine appears normal, +mild upper chest wall tenderness to palpation along anterior axillary line from the 3rd to 6th ICS; no LE edema, no calf tenderness, range of motion is not limited

## 2021-12-22 NOTE — ED PROVIDER NOTE - SKIN, MLM
DO NOT DRIVE UNTIL NEUROLOGIST CLEARS YOU. PLEASE FOLLOW UP WITH NEUROLOGIST IN 2 WEEKS  
Skin normal color for race, warm, dry and intact. No evidence of rash.

## 2021-12-22 NOTE — ED PROVIDER NOTE - OBJECTIVE STATEMENT
50 yo f l sided chest dis after leaving   bonding proced lower gum dentist injected articane to lower area shouldnt have cause sx constant inter stabbing sens occas deep breathing not worse with amb exert   throat tightness inter no diffi eating drink no sob reports pain along post neck rad to lower back of head stiffness took naproxen with imp of chest dis den cough fc trauma to area no heavy lifting no prior hx of heart disease nonsmoker no recent travel or surg 52 yo F presents to the ED c/o L sided chest discomfort after leaving dentist office yesterday. Pt states she had a bonding procedure to lower gums where her dentist injected Articane to lower gum area. Pt spoke with her dentist today who notes the articaine shouldn't have caused the chest discomfort. Pt describes chest discomfort as constant with intermittent stabbing sensation and worse with occasional deep breathing, but not worse with ambulation or exertion. Pt also reports intermittent throat tightness with no difficulty eating/drinking, no SOB. Pt reports pain along posterior neck radiating to lower back of head with some stiffness. Pt took naproxen with improvement of chest discomfort. Pt denies cough, fever, chills, trauma to area, no heavy lifting, no prior Hx of heart disease, no recent travel or surgery. Pt is is nonsmoker.

## 2021-12-22 NOTE — ED PROVIDER NOTE - PATIENT PORTAL LINK FT
You can access the FollowMyHealth Patient Portal offered by [facility] by registering at the following website: [email or web address]. By joining [facility]’s FollowMyHealth portal, you will also be able to view your health information using other applications (apps) compatible with our system.

## 2021-12-22 NOTE — ED PROVIDER NOTE - PROGRESS NOTE DETAILS
Neg trop x 2. D-dimer negative. CXR and soft tissue neck w/o acute findings.  Pt reports feeling better, likely further improvement w/ NSAID taken at home; comfortable w/ DC.  PMD f/u encouraged.  Pt stable on DC.

## 2021-12-22 NOTE — ED PROVIDER NOTE - NSFOLLOWUPINSTRUCTIONS_ED_ALL_ED_FT
Chest Pain    Chest pain can be caused by many different conditions which may or may not be dangerous. Causes include heartburn, lung infections, heart attack, blood clot in lungs, skin infections, strain or damage to muscle, cartilage, or bones, etc. In addition to a history and physical examination, an electrocardiogram (ECG) or other lab tests may have been performed to determine the cause of your chest pain. Follow up with your primary care provider or with a cardiologist as instructed.     SEEK IMMEDIATE MEDICAL CARE IF YOU HAVE ANY OF THE FOLLOWING SYMPTOMS: worsening chest pain, coughing up blood, unexplained back/neck/jaw pain, severe abdominal pain, dizziness or lightheadedness, fainting, shortness of breath, sweaty or clammy skin, vomiting, or racing heart beat. These symptoms may represent a serious problem that is an emergency. Do not wait to see if the symptoms will go away. Get medical help right away. Call 911 and do not drive yourself to the hospital.    YOUR WORK UP DID NOT FIND ANY ACUTE ABNORMALITY.  PLEASE FOLLOW UP WITH YOUR REGULAR DOCTOR.  RETURN FOR ANY WORSENED SYMPTOMS.

## 2021-12-22 NOTE — ED ADULT TRIAGE NOTE - CHIEF COMPLAINT QUOTE
reports having dental procedure yesterday for tooth bonding, in the afternoon developed chest discomfort which is still lingering today. +appears comfortable. take 2 antidepressants and spirolactone

## 2022-01-11 ENCOUNTER — RX RENEWAL (OUTPATIENT)
Age: 52
End: 2022-01-11

## 2022-01-14 ENCOUNTER — TRANSCRIPTION ENCOUNTER (OUTPATIENT)
Age: 52
End: 2022-01-14

## 2022-01-18 ENCOUNTER — NON-APPOINTMENT (OUTPATIENT)
Age: 52
End: 2022-01-18

## 2022-01-18 ENCOUNTER — APPOINTMENT (OUTPATIENT)
Dept: FAMILY MEDICINE | Facility: CLINIC | Age: 52
End: 2022-01-18
Payer: COMMERCIAL

## 2022-01-18 VITALS
OXYGEN SATURATION: 95 % | HEART RATE: 58 BPM | BODY MASS INDEX: 23.57 KG/M2 | HEIGHT: 63 IN | TEMPERATURE: 98 F | SYSTOLIC BLOOD PRESSURE: 109 MMHG | WEIGHT: 133 LBS | DIASTOLIC BLOOD PRESSURE: 69 MMHG

## 2022-01-18 DIAGNOSIS — R07.89 OTHER CHEST PAIN: ICD-10-CM

## 2022-01-18 PROCEDURE — 99214 OFFICE O/P EST MOD 30 MIN: CPT | Mod: 25

## 2022-01-18 PROCEDURE — 36415 COLL VENOUS BLD VENIPUNCTURE: CPT

## 2022-01-18 NOTE — PHYSICAL EXAM
[No Acute Distress] : no acute distress [Normal Sclera/Conjunctiva] : normal sclera/conjunctiva [Normal Outer Ear/Nose] : the outer ears and nose were normal in appearance [Supple] : supple [No Respiratory Distress] : no respiratory distress  [No Edema] : there was no peripheral edema [Soft] : abdomen soft [Non Tender] : non-tender [Non-distended] : non-distended [No Masses] : no abdominal mass palpated [No HSM] : no HSM [No Joint Swelling] : no joint swelling [No Rash] : no rash [Coordination Grossly Intact] : coordination grossly intact [Normal Insight/Judgement] : insight and judgment were intact

## 2022-01-18 NOTE — HISTORY OF PRESENT ILLNESS
[de-identified] : 52 yo female here for follow up. \par \par Reports that she was having a lot of issues with fatigue, especially in November. This has gotten better since. Worked with therapist who believed that it was related to seasonal changes at that time. Interested in potentially coming off on citalopram to see if this helps. \par \par Also struggling with gas pains on and off. Is concerned about a possible gluten intolerance. Is unsure if diet could also be related to fatigue.\par \par Stating that she went a few weeks ago to ER for concern for CP. Had dental work right before with Novocaine and was concerned it was related. Went to [facility] at that time. Every time she swallowed had a strange chest sensation. Had EKG and CXR in the ER and all was fine at that time. Had negative D dimer and troponins as well. Thinks it was likely anxiety vs GERD. Hasn't occurred since.

## 2022-01-28 ENCOUNTER — APPOINTMENT (OUTPATIENT)
Dept: GASTROENTEROLOGY | Facility: CLINIC | Age: 52
End: 2022-01-28

## 2022-03-22 ENCOUNTER — APPOINTMENT (OUTPATIENT)
Dept: FAMILY MEDICINE | Facility: CLINIC | Age: 52
End: 2022-03-22
Payer: COMMERCIAL

## 2022-03-22 ENCOUNTER — NON-APPOINTMENT (OUTPATIENT)
Age: 52
End: 2022-03-22

## 2022-03-22 VITALS
BODY MASS INDEX: 23.57 KG/M2 | HEART RATE: 53 BPM | WEIGHT: 133 LBS | HEIGHT: 63 IN | DIASTOLIC BLOOD PRESSURE: 50 MMHG | SYSTOLIC BLOOD PRESSURE: 88 MMHG | TEMPERATURE: 97.1 F | OXYGEN SATURATION: 99 %

## 2022-03-22 VITALS
BODY MASS INDEX: 23.57 KG/M2 | TEMPERATURE: 97.1 F | HEIGHT: 63 IN | RESPIRATION RATE: 16 BRPM | WEIGHT: 133 LBS | OXYGEN SATURATION: 99 % | HEART RATE: 53 BPM

## 2022-03-22 VITALS — SYSTOLIC BLOOD PRESSURE: 117 MMHG | DIASTOLIC BLOOD PRESSURE: 64 MMHG

## 2022-03-22 DIAGNOSIS — M25.551 PAIN IN RIGHT HIP: ICD-10-CM

## 2022-03-22 DIAGNOSIS — R53.83 OTHER FATIGUE: ICD-10-CM

## 2022-03-22 PROCEDURE — 99396 PREV VISIT EST AGE 40-64: CPT | Mod: 25

## 2022-03-22 PROCEDURE — 36415 COLL VENOUS BLD VENIPUNCTURE: CPT

## 2022-03-22 PROCEDURE — 93000 ELECTROCARDIOGRAM COMPLETE: CPT

## 2022-03-22 PROCEDURE — 99214 OFFICE O/P EST MOD 30 MIN: CPT | Mod: 25

## 2022-03-22 RX ORDER — CITALOPRAM HYDROBROMIDE 10 MG/1
10 TABLET, FILM COATED ORAL
Qty: 90 | Refills: 0 | Status: DISCONTINUED | COMMUNITY
Start: 2021-07-12 | End: 2022-03-22

## 2022-03-22 NOTE — HEALTH RISK ASSESSMENT
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

## 2022-03-22 NOTE — HISTORY OF PRESENT ILLNESS
[de-identified] : 52 yo female here for CPE. \par \par Reports that she was having a lot of issues with fatigue, especially in November. This has gotten better since. Worked with therapist who believed that it was related to seasonal changes at that time. Tapered off citalopram with improvement in fatigue. \par \par Admitting to positional vertigo for about 10 days. Feels like the room is spinning at times. Had episodes of nausea as well at the same time. No CP or SOB. No ear pressure or pain. Symptoms have improved. Not taking any medicine. Symptoms worse with moving her head. Has tried Epley maneuver 1 time with some improvement. \par \par C/o right hip and low back pain for a long time. Saw chiropractor without improvement.

## 2022-03-22 NOTE — ASSESSMENT
[FreeTextEntry1] : CPE_ Well exam, comprehensive labs ordered. STD panel declined. GYN follow up for PAP this summer. Seeing GI tomorrow, will schedule colonoscopy. Mammo up to date. Follow up labs.

## 2022-03-23 ENCOUNTER — LABORATORY RESULT (OUTPATIENT)
Age: 52
End: 2022-03-23

## 2022-03-23 ENCOUNTER — APPOINTMENT (OUTPATIENT)
Dept: GASTROENTEROLOGY | Facility: CLINIC | Age: 52
End: 2022-03-23
Payer: COMMERCIAL

## 2022-03-23 ENCOUNTER — TRANSCRIPTION ENCOUNTER (OUTPATIENT)
Age: 52
End: 2022-03-23

## 2022-03-23 VITALS
DIASTOLIC BLOOD PRESSURE: 60 MMHG | TEMPERATURE: 96.9 F | RESPIRATION RATE: 15 BRPM | SYSTOLIC BLOOD PRESSURE: 105 MMHG | WEIGHT: 131 LBS | OXYGEN SATURATION: 98 % | BODY MASS INDEX: 23.21 KG/M2 | HEART RATE: 61 BPM | HEIGHT: 63 IN

## 2022-03-23 DIAGNOSIS — R14.0 ABDOMINAL DISTENSION (GASEOUS): ICD-10-CM

## 2022-03-23 LAB
25(OH)D3 SERPL-MCNC: 62.8 NG/ML
ALBUMIN SERPL ELPH-MCNC: 4.5 G/DL
ALP BLD-CCNC: 36 U/L
ALT SERPL-CCNC: 13 U/L
ANION GAP SERPL CALC-SCNC: 12 MMOL/L
APPEARANCE: CLEAR
AST SERPL-CCNC: 20 U/L
BACTERIA: NEGATIVE
BASOPHILS # BLD AUTO: 0.06 K/UL
BASOPHILS NFR BLD AUTO: 1.1 %
BILIRUB SERPL-MCNC: 0.8 MG/DL
BILIRUBIN URINE: NEGATIVE
BLOOD URINE: NEGATIVE
BUN SERPL-MCNC: 12 MG/DL
CALCIUM SERPL-MCNC: 9.6 MG/DL
CHLORIDE SERPL-SCNC: 103 MMOL/L
CHOLEST SERPL-MCNC: 169 MG/DL
CO2 SERPL-SCNC: 24 MMOL/L
COLOR: NORMAL
CREAT SERPL-MCNC: 0.75 MG/DL
EGFR: 98 ML/MIN/1.73M2
EOSINOPHIL # BLD AUTO: 0.07 K/UL
EOSINOPHIL NFR BLD AUTO: 1.3 %
ESTIMATED AVERAGE GLUCOSE: 97 MG/DL
FERRITIN SERPL-MCNC: 36 NG/ML
FOLATE SERPL-MCNC: 13 NG/ML
GLUCOSE QUALITATIVE U: NEGATIVE
GLUCOSE SERPL-MCNC: 83 MG/DL
HBA1C MFR BLD HPLC: 5 %
HCT VFR BLD CALC: 37.6 %
HDLC SERPL-MCNC: 75 MG/DL
HGB BLD-MCNC: 12.1 G/DL
HYALINE CASTS: 1 /LPF
IMM GRANULOCYTES NFR BLD AUTO: 0.2 %
IRON SATN MFR SERPL: 60 %
IRON SERPL-MCNC: 204 UG/DL
KETONES URINE: NEGATIVE
LDLC SERPL CALC-MCNC: 85 MG/DL
LEUKOCYTE ESTERASE URINE: NEGATIVE
LYMPHOCYTES # BLD AUTO: 1.58 K/UL
LYMPHOCYTES NFR BLD AUTO: 29.2 %
MAN DIFF?: NORMAL
MCHC RBC-ENTMCNC: 32.2 GM/DL
MCHC RBC-ENTMCNC: 32.7 PG
MCV RBC AUTO: 101.6 FL
MICROSCOPIC-UA: NORMAL
MONOCYTES # BLD AUTO: 0.53 K/UL
MONOCYTES NFR BLD AUTO: 9.8 %
NEUTROPHILS # BLD AUTO: 3.17 K/UL
NEUTROPHILS NFR BLD AUTO: 58.4 %
NITRITE URINE: NEGATIVE
NONHDLC SERPL-MCNC: 94 MG/DL
PH URINE: 6.5
PLATELET # BLD AUTO: 329 K/UL
POTASSIUM SERPL-SCNC: 4.8 MMOL/L
PROT SERPL-MCNC: 6.8 G/DL
PROTEIN URINE: NEGATIVE
RBC # BLD: 3.7 M/UL
RBC # FLD: 11.9 %
RED BLOOD CELLS URINE: 1 /HPF
SODIUM SERPL-SCNC: 140 MMOL/L
SPECIFIC GRAVITY URINE: 1.01
SQUAMOUS EPITHELIAL CELLS: 1 /HPF
T4 FREE SERPL-MCNC: 0.9 NG/DL
TIBC SERPL-MCNC: 343 UG/DL
TRANSFERRIN SERPL-MCNC: 286 MG/DL
TRIGL SERPL-MCNC: 49 MG/DL
TSH SERPL-ACNC: 3.27 UIU/ML
UIBC SERPL-MCNC: 139 UG/DL
UROBILINOGEN URINE: NORMAL
VIT B12 SERPL-MCNC: 424 PG/ML
WBC # FLD AUTO: 5.42 K/UL
WHITE BLOOD CELLS URINE: 0 /HPF

## 2022-03-23 PROCEDURE — 36415 COLL VENOUS BLD VENIPUNCTURE: CPT

## 2022-03-23 PROCEDURE — 99205 OFFICE O/P NEW HI 60 MIN: CPT | Mod: 25

## 2022-03-24 LAB — UREA BREATH TEST QL: NEGATIVE

## 2022-03-24 NOTE — HISTORY OF PRESENT ILLNESS
[de-identified] : 50 yo F no significant PMH who presents for evaluation of a colonoscopy, concern about gluten intolerance, gassiness, fatigue, episode of chest discomfort, cough with phlegm after eating\par \par Patient reports she is due for a colonoscopy. Has never had a colonoscopy in the past. \par Her best friend from college is a gastroenterologist in [place]. \par \par She is concerned she is gluten intolerance. She wants to talk about that.\par When she eats gluten gets gas\par Tries to have gluten free bread, occasionally will have dedra bread\par Feels gassy right away \par Gets fatigue, eczema and bad skin stuff \par When she tries to be very strict with diet feels more energetic and feels better\par \par \par In January had a chest pain scare - went to hospital and heart was ok. She is wondering if it is related to acid reflux. \par \par She has been having a cough after eating that she thinks may be GERD. Not specifically worse after any foods. Avoids dairy and soy. \par Quit smoking 5 yrs ago and cough has continued\par Was seen by ENT for this complaint in Oct 2019. \par Has not done PPI or H2 blocker\par \par Ears pop when swallows\par Had tubes put into ears, bad tonsillitis, and frequent ear infection as a kid. \par \par Gets a cough with phlegm a few seconds after eating. \par No classic symptoms of acid reflux, no heartburn. \par Had a laryngoscopy (through an ENT, Dr Tena \par Per Dr. Tena consideration for LPR vs food allergy, vs other cause of cough such as variant astham. Never did PPI trial. \par NO prior EGD\par \par no significant weight loss\par Per notes had some issues with rectal itching\par \par No blood thinners no asa, plavix, coumadin\par \par PMH: denies\par \par PSH: \par  \par \par FH: denies GI malig that she aware of\par \par SH: \par quit smoknig\par social ETOH\par \par MED: per chart\par \par ALL: NKDA\par

## 2022-03-24 NOTE — ASSESSMENT
[FreeTextEntry1] : 50 yo F no significant PMH who presents for evaluation of a colonoscopy, concern about gluten intolerance, gassiness, fatigue, episode of chest discomfort, cough with phlegm after eating\par \par Abdominal discomfort, gassiness and flatulence, particularly with certain foods such as gluten and dairy, chronic cough (with possible prior diagnosis of LPR, patient is concerned this may be related to acid reflux given symptoms seem to start after eating). Cough could also be a food allergy (given happens immediately after eating though she is unable to identify any specific triggering foods, vs asthma. Also recently in the hospital for chest pain episode, cardiac work up was unrevealing, wondering whether this could possibly be of a GI source vs anxiety (at the time had dental work right before with novocaine; has not had it since then) \par - check celiac blood work \par - check H pylori breath test\par - plan for EGD at the time of the colonoscopy for evaluation of possible acid reflux\par - discussed 30 day PPI trial which patient does not want to do at this time, was recommended to do this in the past by an ENT as well, but never did the trial \par - diet and lifestyle modifications discussed for reflux \par - can try gassex for bloating/gas\par - discussed FODMAP diet which can be discussed at future visit\par \par Colorectal cancer screening\par - - will schedule for a colonoscopy\par - reviewed r/b/a/i of the procedures including but not limited to bleeding, missing an abnormal finding, perforation, infection, missed polyp.\par - patient gives verbal consent.  Written consent to be obtained at time of procedure\par - reviewed bowel preparation instructions in detail\par - needs adult escort the day of the procedure\par - may need COVID-19 testing within 3 days of procedure\par - preprocedure labs reviewed and in the chart\par \par Fatigue\par - wondering if this is related to food such as gluten\par - has been working with a therapist \par - B12 was low end of normal, started B12 supplements\par \par Follow up post-procedure

## 2022-03-24 NOTE — PHYSICAL EXAM
[General Appearance - Alert] : alert [General Appearance - In No Acute Distress] : in no acute distress [Sclera] : the sclera and conjunctiva were normal [PERRL With Normal Accommodation] : pupils were equal in size, round, and reactive to light [Extraocular Movements] : extraocular movements were intact [Outer Ear] : the ears and nose were normal in appearance [Oropharynx] : the oropharynx was normal [Neck Appearance] : the appearance of the neck was normal [Neck Cervical Mass (___cm)] : no neck mass was observed [Jugular Venous Distention Increased] : there was no jugular-venous distention [Thyroid Diffuse Enlargement] : the thyroid was not enlarged [Thyroid Nodule] : there were no palpable thyroid nodules [Auscultation Breath Sounds / Voice Sounds] : lungs were clear to auscultation bilaterally [Heart Rate And Rhythm] : heart rate was normal and rhythm regular [Heart Sounds] : normal S1 and S2 [Heart Sounds Gallop] : no gallops [Murmurs] : no murmurs [Heart Sounds Pericardial Friction Rub] : no pericardial rub [Edema] : there was no peripheral edema [Bowel Sounds] : normal bowel sounds [Abdomen Soft] : soft [Abdomen Tenderness] : non-tender [Abdomen Mass (___ Cm)] : no abdominal mass palpated [Cervical Lymph Nodes Enlarged Posterior Bilaterally] : posterior cervical [Cervical Lymph Nodes Enlarged Anterior Bilaterally] : anterior cervical [No CVA Tenderness] : no ~M costovertebral angle tenderness [No Spinal Tenderness] : no spinal tenderness [Abnormal Walk] : normal gait [Nail Clubbing] : no clubbing  or cyanosis of the fingernails [Musculoskeletal - Swelling] : no joint swelling seen [Motor Tone] : muscle strength and tone were normal [Skin Color & Pigmentation] : normal skin color and pigmentation [Skin Turgor] : normal skin turgor [] : no rash [Oriented To Time, Place, And Person] : oriented to person, place, and time [Impaired Insight] : insight and judgment were intact [Affect] : the affect was normal

## 2022-03-29 LAB
CELIAC DISEASE INTERPRETATION: NORMAL
CELIAC GENE PAIRS PRESENT: YES
DQ ALPHA 1: NORMAL
DQ BETA 1: NORMAL
IMMUNOGLOBULIN A (IGA): 189 MG/DL

## 2022-04-04 ENCOUNTER — OUTPATIENT (OUTPATIENT)
Dept: OUTPATIENT SERVICES | Facility: HOSPITAL | Age: 52
LOS: 1 days | End: 2022-04-04
Payer: COMMERCIAL

## 2022-04-04 ENCOUNTER — RESULT REVIEW (OUTPATIENT)
Age: 52
End: 2022-04-04

## 2022-04-04 ENCOUNTER — APPOINTMENT (OUTPATIENT)
Dept: ORTHOPEDIC SURGERY | Facility: CLINIC | Age: 52
End: 2022-04-04
Payer: COMMERCIAL

## 2022-04-04 VITALS — RESPIRATION RATE: 16 BRPM | HEIGHT: 63 IN | BODY MASS INDEX: 23.21 KG/M2 | WEIGHT: 131 LBS

## 2022-04-04 DIAGNOSIS — M70.61 TROCHANTERIC BURSITIS, RIGHT HIP: ICD-10-CM

## 2022-04-04 DIAGNOSIS — M62.89 OTHER SPECIFIED DISORDERS OF MUSCLE: ICD-10-CM

## 2022-04-04 PROCEDURE — 72170 X-RAY EXAM OF PELVIS: CPT | Mod: 26

## 2022-04-04 PROCEDURE — 72110 X-RAY EXAM L-2 SPINE 4/>VWS: CPT | Mod: 26

## 2022-04-04 PROCEDURE — 99213 OFFICE O/P EST LOW 20 MIN: CPT

## 2022-04-04 PROCEDURE — 72170 X-RAY EXAM OF PELVIS: CPT

## 2022-04-04 PROCEDURE — 72110 X-RAY EXAM L-2 SPINE 4/>VWS: CPT

## 2022-04-04 RX ORDER — GABAPENTIN 300 MG/1
300 CAPSULE ORAL DAILY
Qty: 60 | Refills: 2 | Status: DISCONTINUED | COMMUNITY
Start: 2020-10-28 | End: 2022-04-04

## 2022-04-04 RX ORDER — FAMOTIDINE 20 MG/1
20 TABLET, FILM COATED ORAL
Qty: 60 | Refills: 5 | Status: DISCONTINUED | COMMUNITY
Start: 2019-10-08 | End: 2022-04-04

## 2022-04-04 RX ORDER — FLUTICASONE PROPIONATE 50 UG/1
50 SPRAY, METERED NASAL TWICE DAILY
Qty: 1 | Refills: 1 | Status: DISCONTINUED | COMMUNITY
Start: 2022-03-22 | End: 2022-04-04

## 2022-04-04 RX ORDER — MINOCYCLINE HYDROCHLORIDE 90 MG/1
90 TABLET, FILM COATED, EXTENDED RELEASE ORAL
Refills: 0 | Status: DISCONTINUED | COMMUNITY
Start: 2019-09-17 | End: 2022-04-04

## 2022-04-04 RX ORDER — MUPIROCIN 20 MG/G
2 OINTMENT TOPICAL 3 TIMES DAILY
Qty: 1 | Refills: 0 | Status: DISCONTINUED | COMMUNITY
Start: 2020-05-21 | End: 2022-04-04

## 2022-04-07 ENCOUNTER — APPOINTMENT (OUTPATIENT)
Dept: GASTROENTEROLOGY | Facility: CLINIC | Age: 52
End: 2022-04-07
Payer: COMMERCIAL

## 2022-04-07 ENCOUNTER — RESULT REVIEW (OUTPATIENT)
Age: 52
End: 2022-04-07

## 2022-04-07 DIAGNOSIS — K25.9 GASTRIC ULCER, UNSPECIFIED AS ACUTE OR CHRONIC, W/OUT HEMORRHAGE OR PERFORATION: ICD-10-CM

## 2022-04-07 PROCEDURE — 43239 EGD BIOPSY SINGLE/MULTIPLE: CPT

## 2022-04-07 PROCEDURE — 45378 DIAGNOSTIC COLONOSCOPY: CPT | Mod: 33

## 2022-04-07 RX ORDER — OMEPRAZOLE 40 MG/1
40 CAPSULE, DELAYED RELEASE ORAL DAILY
Qty: 30 | Refills: 2 | Status: ACTIVE | COMMUNITY
Start: 2022-04-07 | End: 1900-01-01

## 2022-04-11 PROBLEM — Z11.59 SCREENING FOR VIRAL DISEASE: Status: ACTIVE | Noted: 2020-05-21

## 2022-04-19 ENCOUNTER — TRANSCRIPTION ENCOUNTER (OUTPATIENT)
Age: 52
End: 2022-04-19

## 2022-04-29 ENCOUNTER — TRANSCRIPTION ENCOUNTER (OUTPATIENT)
Age: 52
End: 2022-04-29

## 2022-05-02 ENCOUNTER — APPOINTMENT (OUTPATIENT)
Dept: GASTROENTEROLOGY | Facility: CLINIC | Age: 52
End: 2022-05-02
Payer: COMMERCIAL

## 2022-05-02 VITALS
BODY MASS INDEX: 23.04 KG/M2 | HEART RATE: 58 BPM | SYSTOLIC BLOOD PRESSURE: 100 MMHG | TEMPERATURE: 97.1 F | WEIGHT: 130 LBS | HEIGHT: 63 IN | RESPIRATION RATE: 15 BRPM | OXYGEN SATURATION: 98 % | DIASTOLIC BLOOD PRESSURE: 62 MMHG

## 2022-05-02 PROCEDURE — 99214 OFFICE O/P EST MOD 30 MIN: CPT

## 2022-05-05 ENCOUNTER — TRANSCRIPTION ENCOUNTER (OUTPATIENT)
Age: 52
End: 2022-05-05

## 2022-05-06 ENCOUNTER — TRANSCRIPTION ENCOUNTER (OUTPATIENT)
Age: 52
End: 2022-05-06

## 2022-05-11 ENCOUNTER — TRANSCRIPTION ENCOUNTER (OUTPATIENT)
Age: 52
End: 2022-05-11

## 2022-05-11 DIAGNOSIS — R92.2 INCONCLUSIVE MAMMOGRAM: ICD-10-CM

## 2022-05-11 DIAGNOSIS — Z12.39 ENCOUNTER FOR OTHER SCREENING FOR MALIGNANT NEOPLASM OF BREAST: ICD-10-CM

## 2022-05-15 NOTE — HISTORY OF PRESENT ILLNESS
[de-identified] : 52 yo F who presents for follow up after recent EGD and colonoscopy for evaluation of concern about gluten intolerance, gassiness, fatigue, episode of chest discomfort, cough with phlegm after eating, and due for CRC screening. \par \par Patient reports cough has resolved since being on PPI\par She notes that she tried a restricted diet but has not been able to follow it because wants to "enjoy life" and have a glass of wine, coffee, etc. \par \par EGD 22\par normal esophagus\par a few localized erosions/ulcerations with no stigmata of recent bleeding were found in the gastric antrum. Biopsies were taken with cold forceps for histology. \par erosive gastropathy with no stigmata of recent bleeding, biopsied\par mild duodenitis in t he duodenal bulb, otherwise normal duodenum\par consider repeat EGD in 6-8 wks to ensure resolution of erosions/ulcerations\par \par Path: \par duodenum normal\par Stomach: \par focal chronic inflammation/mild chronic inflammation in body\par no IM or dysplasia\par no HP\par \par Disaccharidase testing normal\par \par \par Colonoscopy 22\par The examined portion of the ileum was normal \par diverticulosis in the sigmoid colon\par Internal hemorrhoids\par Otherwise normal colon\par No specimens collected\par Repeat 5 yrs\par \par PRIOR HPI\par 52 yo F no significant PMH who presents for evaluation of a colonoscopy, concern about gluten intolerance, gassiness, fatigue, episode of chest discomfort, cough with phlegm after eating\par \par Patient reports she is due for a colonoscopy. Has never had a colonoscopy in the past. \par Her best friend from [employer] is a gastroenterologist in [place]. \par \par She is concerned she is gluten intolerance. She wants to talk about that.\par When she eats gluten gets gas\par Tries to have gluten free bread, occasionally will have dedra bread\par Feels gassy right away \par Gets fatigue, eczema and bad skin stuff \par When she tries to be very strict with diet feels more energetic and feels better\par \par \par In January had a chest pain scare - went to hospital and heart was ok. She is wondering if it is related to acid reflux. \par \par She has been having a cough after eating that she thinks may be GERD. Not specifically worse after any foods. Avoids dairy and soy. \par Quit smoking 5 yrs ago and cough has continued\par Was seen by ENT for this complaint in Oct 2019. \par Has not done PPI or H2 blocker\par \par Ears pop when swallows\par Had tubes put into ears, bad tonsillitis, and frequent ear infection as a kid. \par \par Gets a cough with phlegm a few seconds after eating. \par No classic symptoms of acid reflux, no heartburn. \par Had a laryngoscopy (through an ENT, Dr Tena \par Per Dr. Tena consideration for LPR vs food allergy, vs other cause of cough such as variant astham. Never did PPI trial. \par NO prior EGD\par \par no significant weight loss\par Per notes had some issues with rectal itching\par \par No blood thinners no asa, plavix, coumadin\par \par PMH: denies\par \par PSH: \par  \par \par FH: denies GI malig that she aware of\par \par SH: \par quit smoknig\par social ETOH\par \par MED: per chart\par \par ALL: NKDA\par

## 2022-05-15 NOTE — ASSESSMENT
[FreeTextEntry1] : 50 yo F who presents for follow up after recent EGD and colonoscopy for evaluation of concern about gluten intolerance, gassiness, fatigue, episode of chest discomfort, cough with phlegm after eating, and due for CRC screening. \par \par Abdominal discomfort, gassiness and flatulence, particularly with certain foods such as gluten and dairy, chronic cough (with possible prior diagnosis of LPR, patient is concerned this may be related to acid reflux given symptoms seem to start after eating). Cough could also be a food allergy (given happens immediately after eating though she is unable to identify any specific triggering foods, vs asthma. Also recently in the hospital for chest pain episode, cardiac work up was unrevealing, wondering whether this could possibly be of a GI source vs anxiety (at the time had dental work right before with novocaine; has not had it since then) \par - celiac bloodwork unrevealing (celiac gene pairs present, but otherwise negative)\par - HP breath test and biopsies unremarkable\par - EGD with a few erosions/ulcerations in the gastric antrum, gastropathy\par - consider repeat EGD in 8 weeks to evaluate resolution of gastric erosions/ulcerations after PPI\par - cough symptoms have improved on PPI\par - diet and lifestyle modifications discussed for reflux (patient does not always follow anti-reflux diet but understands her triggers)\par - can try gassex for bloating/gas\par - discussed FODMAP diet which can be discussed at future visit\par \par Colorectal cancer screening\par - s/p colonoscopy 4/7/22 with diverticulosis sigmoid colon, internal hemorrhoids\par - repeat in 5 yrs\par \par Fatigue\par - wondering if this is related to food such as gluten\par - has been working with a therapist \par - B12 was low end of normal, started B12 supplements\par \par Follow up post-procedure

## 2022-05-27 ENCOUNTER — NON-APPOINTMENT (OUTPATIENT)
Age: 52
End: 2022-05-27

## 2022-05-31 ENCOUNTER — TRANSCRIPTION ENCOUNTER (OUTPATIENT)
Age: 52
End: 2022-05-31

## 2022-06-03 ENCOUNTER — TRANSCRIPTION ENCOUNTER (OUTPATIENT)
Age: 52
End: 2022-06-03

## 2022-06-03 ENCOUNTER — RX RENEWAL (OUTPATIENT)
Age: 52
End: 2022-06-03

## 2022-06-07 ENCOUNTER — TRANSCRIPTION ENCOUNTER (OUTPATIENT)
Age: 52
End: 2022-06-07

## 2022-06-20 ENCOUNTER — NON-APPOINTMENT (OUTPATIENT)
Age: 52
End: 2022-06-20

## 2022-06-24 DIAGNOSIS — U07.1 COVID-19: ICD-10-CM

## 2022-08-01 ENCOUNTER — APPOINTMENT (OUTPATIENT)
Dept: ORTHOPEDIC SURGERY | Facility: CLINIC | Age: 52
End: 2022-08-01

## 2022-08-01 VITALS — HEIGHT: 63 IN | WEIGHT: 130 LBS | RESPIRATION RATE: 16 BRPM | BODY MASS INDEX: 23.04 KG/M2

## 2022-08-01 PROCEDURE — 73610 X-RAY EXAM OF ANKLE: CPT | Mod: LT

## 2022-08-01 PROCEDURE — 99214 OFFICE O/P EST MOD 30 MIN: CPT

## 2022-08-03 ENCOUNTER — TRANSCRIPTION ENCOUNTER (OUTPATIENT)
Age: 52
End: 2022-08-03

## 2022-08-03 ENCOUNTER — APPOINTMENT (OUTPATIENT)
Dept: OBGYN | Facility: CLINIC | Age: 52
End: 2022-08-03

## 2022-08-09 ENCOUNTER — TRANSCRIPTION ENCOUNTER (OUTPATIENT)
Age: 52
End: 2022-08-09

## 2022-08-29 ENCOUNTER — APPOINTMENT (OUTPATIENT)
Dept: ULTRASOUND IMAGING | Facility: CLINIC | Age: 52
End: 2022-08-29

## 2022-08-29 ENCOUNTER — OUTPATIENT (OUTPATIENT)
Dept: OUTPATIENT SERVICES | Facility: HOSPITAL | Age: 52
LOS: 1 days | End: 2022-08-29

## 2022-08-29 ENCOUNTER — RESULT REVIEW (OUTPATIENT)
Age: 52
End: 2022-08-29

## 2022-08-29 ENCOUNTER — APPOINTMENT (OUTPATIENT)
Dept: MAMMOGRAPHY | Facility: CLINIC | Age: 52
End: 2022-08-29

## 2022-08-29 PROCEDURE — 77063 BREAST TOMOSYNTHESIS BI: CPT | Mod: 26

## 2022-08-29 PROCEDURE — 77067 SCR MAMMO BI INCL CAD: CPT | Mod: 26

## 2022-08-29 PROCEDURE — 76641 ULTRASOUND BREAST COMPLETE: CPT | Mod: 26,50

## 2022-08-30 ENCOUNTER — APPOINTMENT (OUTPATIENT)
Dept: OBGYN | Facility: CLINIC | Age: 52
End: 2022-08-30

## 2022-08-30 ENCOUNTER — NON-APPOINTMENT (OUTPATIENT)
Age: 52
End: 2022-08-30

## 2022-08-30 VITALS
HEIGHT: 63 IN | OXYGEN SATURATION: 100 % | DIASTOLIC BLOOD PRESSURE: 60 MMHG | SYSTOLIC BLOOD PRESSURE: 100 MMHG | BODY MASS INDEX: 23.04 KG/M2 | WEIGHT: 130 LBS

## 2022-08-30 DIAGNOSIS — Z11.3 ENCOUNTER FOR SCREENING FOR INFECTIONS WITH A PREDOMINANTLY SEXUAL MODE OF TRANSMISSION: ICD-10-CM

## 2022-08-30 PROCEDURE — 99396 PREV VISIT EST AGE 40-64: CPT

## 2022-08-30 PROCEDURE — 36415 COLL VENOUS BLD VENIPUNCTURE: CPT

## 2022-08-30 NOTE — HISTORY OF PRESENT ILLNESS
[Patient reported mammogram was normal] : Patient reported mammogram was normal [Patient reported breast sonogram was normal] : Patient reported breast sonogram was normal [Patient reported PAP Smear was normal] : Patient reported PAP Smear was normal [Patient reported colonoscopy was normal] : Patient reported colonoscopy was normal [Y] : Positive pregnancy history [No] : Patient does not have concerns regarding sex [Currently Active] : currently active [Patient would like to be screened for STIs] : Patient would like to be screened for STIs [N] : Patient does not use contraception [Mammogramdate] : 08/29/22 [BreastSonogramDate] : 08/29/22 [PapSmeardate] : 06/22/20 [ColonoscopyDate] : 04/07/22 [LMPDate] : 08/10/22 [MensesFreq] : 21 [MensesLength] : 3-5 [PGHxTotal] : 2 [PGHxAbortions] : 2 [[facility] Center[facility]Living] : 0 [PGHxABInduced] : 2 [TextBox_6] : 08/10/22 [TextBox_9] : 14 [FreeTextEntry1] : 08/10/22 [Both] : men and women [Yes] : Yes

## 2022-08-30 NOTE — PHYSICAL EXAM
[Chaperone Present] : A chaperone was present in the examining room during all aspects of the physical examination [FreeTextEntry1] : Edwige [Appropriately responsive] : appropriately responsive [Alert] : alert [No Acute Distress] : no acute distress [No Lymphadenopathy] : no lymphadenopathy [Soft] : soft [Non-tender] : non-tender [Non-distended] : non-distended [No HSM] : No HSM [No Lesions] : no lesions [No Mass] : no mass [Oriented x3] : oriented x3 [Examination Of The Breasts] : a normal appearance [No Masses] : no breast masses were palpable [Labia Majora] : normal [Labia Minora] : normal [Normal] : normal [Uterine Adnexae] : normal

## 2022-09-01 LAB
C TRACH RRNA SPEC QL NAA+PROBE: NOT DETECTED
CANDIDA VAG CYTO: NOT DETECTED
G VAGINALIS+PREV SP MTYP VAG QL MICRO: DETECTED
HBV SURFACE AG SER QL: NONREACTIVE
HCV AB SER QL: NONREACTIVE
HCV S/CO RATIO: 0.1 S/CO
HIV1+2 AB SPEC QL IA.RAPID: NONREACTIVE
HPV HIGH+LOW RISK DNA PNL CVX: NOT DETECTED
N GONORRHOEA RRNA SPEC QL NAA+PROBE: NOT DETECTED
SOURCE AMPLIFICATION: NORMAL
T PALLIDUM AB SER QL IA: NEGATIVE
T VAGINALIS VAG QL WET PREP: NOT DETECTED

## 2022-09-05 LAB — CYTOLOGY CVX/VAG DOC THIN PREP: NORMAL

## 2022-09-07 ENCOUNTER — NON-APPOINTMENT (OUTPATIENT)
Age: 52
End: 2022-09-07

## 2022-09-08 ENCOUNTER — APPOINTMENT (OUTPATIENT)
Dept: FAMILY MEDICINE | Facility: CLINIC | Age: 52
End: 2022-09-08

## 2022-09-08 ENCOUNTER — RX RENEWAL (OUTPATIENT)
Age: 52
End: 2022-09-08

## 2022-09-08 ENCOUNTER — TRANSCRIPTION ENCOUNTER (OUTPATIENT)
Age: 52
End: 2022-09-08

## 2022-09-08 DIAGNOSIS — K21.9 GASTRO-ESOPHAGEAL REFLUX DISEASE W/OUT ESOPHAGITIS: ICD-10-CM

## 2022-09-08 DIAGNOSIS — R42 DIZZINESS AND GIDDINESS: ICD-10-CM

## 2022-09-08 PROCEDURE — 99214 OFFICE O/P EST MOD 30 MIN: CPT | Mod: 95

## 2022-09-08 NOTE — HISTORY OF PRESENT ILLNESS
[Home] : at home, [unfilled] , at the time of the visit. [Other Location: e.g. Home (Enter Location, City,State)___] : at [unfilled] [Verbal consent obtained from patient] : the patient, [unfilled] [de-identified] : 51 yo female here for follow up. Last CPE was having vertigo spells, but this has since improved after Epley maneuver. No recurrence. \par \par Pt saw Gastro in May. Was recommended to take omeprazole for coughing after eating. Was taking regularly for a few months with improvement in cough. Also had EGD with some gastric ulcerations. No stomach pain, heartburn, black stools or dark stools. Has been taking Gassex prn which helps. Cough is back now since stopping PPI.\par \par Also sprained her ankle in July. Saw ortho at that time. Was advised to go to PT but hasn't had a chance yet. Still with some ankle pain, but has improved with home exercises.H/o recurrent ankle injuries on that side. Will follow up soon.

## 2022-10-25 ENCOUNTER — TRANSCRIPTION ENCOUNTER (OUTPATIENT)
Age: 52
End: 2022-10-25

## 2022-11-02 ENCOUNTER — APPOINTMENT (OUTPATIENT)
Dept: ORTHOPEDIC SURGERY | Facility: CLINIC | Age: 52
End: 2022-11-02

## 2022-11-02 VITALS — WEIGHT: 130 LBS | RESPIRATION RATE: 16 BRPM | HEIGHT: 63 IN | BODY MASS INDEX: 23.04 KG/M2

## 2022-11-02 DIAGNOSIS — M21.6X2 OTHER ACQUIRED DEFORMITIES OF LEFT FOOT: ICD-10-CM

## 2022-11-02 DIAGNOSIS — G57.92 UNSPECIFIED MONONEUROPATHY OF LEFT LOWER LIMB: ICD-10-CM

## 2022-11-02 DIAGNOSIS — M24.272 DISORDER OF LIGAMENT, LEFT ANKLE: ICD-10-CM

## 2022-11-02 DIAGNOSIS — Q66.70 CONGEN PES CAVUS, UNSP FOOT: ICD-10-CM

## 2022-11-02 PROCEDURE — 99213 OFFICE O/P EST LOW 20 MIN: CPT

## 2022-11-02 RX ORDER — NAPROXEN 500 MG/1
500 TABLET ORAL
Qty: 1 | Refills: 0 | Status: DISCONTINUED | COMMUNITY
Start: 2021-12-16 | End: 2022-11-02

## 2022-11-02 RX ORDER — MELOXICAM 15 MG/1
15 TABLET ORAL DAILY
Qty: 1 | Refills: 1 | Status: DISCONTINUED | COMMUNITY
Start: 2022-04-04 | End: 2022-11-02

## 2022-11-02 RX ORDER — DICLOFENAC SODIUM 1% 10 MG/G
1 GEL TOPICAL
Qty: 1 | Refills: 4 | Status: ACTIVE | COMMUNITY
Start: 2022-11-02 | End: 1900-01-01

## 2022-11-03 ENCOUNTER — APPOINTMENT (OUTPATIENT)
Dept: GASTROENTEROLOGY | Facility: CLINIC | Age: 52
End: 2022-11-03

## 2022-12-13 ENCOUNTER — TRANSCRIPTION ENCOUNTER (OUTPATIENT)
Age: 52
End: 2022-12-13

## 2022-12-18 ENCOUNTER — NON-APPOINTMENT (OUTPATIENT)
Age: 52
End: 2022-12-18

## 2022-12-19 ENCOUNTER — TRANSCRIPTION ENCOUNTER (OUTPATIENT)
Age: 52
End: 2022-12-19

## 2022-12-21 ENCOUNTER — TRANSCRIPTION ENCOUNTER (OUTPATIENT)
Age: 52
End: 2022-12-21

## 2022-12-21 DIAGNOSIS — G47.25 CIRCADIAN RHYTHM SLEEP DISORDER, JET LAG TYPE: ICD-10-CM

## 2022-12-21 RX ORDER — ZOLPIDEM TARTRATE 5 MG/1
5 TABLET ORAL
Qty: 5 | Refills: 0 | Status: ACTIVE | COMMUNITY
Start: 2022-12-21 | End: 1900-01-01

## 2023-01-17 ENCOUNTER — APPOINTMENT (OUTPATIENT)
Dept: FAMILY MEDICINE | Facility: CLINIC | Age: 53
End: 2023-01-17

## 2023-01-20 ENCOUNTER — APPOINTMENT (OUTPATIENT)
Dept: ORTHOPEDIC SURGERY | Facility: CLINIC | Age: 53
End: 2023-01-20
Payer: COMMERCIAL

## 2023-01-20 VITALS — BODY MASS INDEX: 23.04 KG/M2 | WEIGHT: 130 LBS | HEIGHT: 63 IN

## 2023-01-20 DIAGNOSIS — S61.213A LACERATION W/OUT FOREIGN BODY OF LEFT MIDDLE FINGER W/OUT DAMAGE TO NAIL, INITIAL ENCOUNTER: ICD-10-CM

## 2023-01-20 DIAGNOSIS — Z78.9 OTHER SPECIFIED HEALTH STATUS: ICD-10-CM

## 2023-01-20 DIAGNOSIS — M79.672 PAIN IN LEFT FOOT: ICD-10-CM

## 2023-01-20 DIAGNOSIS — Z87.898 PERSONAL HISTORY OF OTHER SPECIFIED CONDITIONS: ICD-10-CM

## 2023-01-20 DIAGNOSIS — S93.402D SPRAIN OF UNSPECIFIED LIGAMENT OF LEFT ANKLE, SUBSEQUENT ENCOUNTER: ICD-10-CM

## 2023-01-20 DIAGNOSIS — M25.371 OTHER INSTABILITY, RIGHT ANKLE: ICD-10-CM

## 2023-01-20 DIAGNOSIS — M25.579 PAIN IN UNSPECIFIED ANKLE AND JOINTS OF UNSPECIFIED FOOT: ICD-10-CM

## 2023-01-20 DIAGNOSIS — M25.372 OTHER INSTABILITY, LEFT ANKLE: ICD-10-CM

## 2023-01-20 DIAGNOSIS — M25.572 PAIN IN LEFT ANKLE AND JOINTS OF LEFT FOOT: ICD-10-CM

## 2023-01-20 PROCEDURE — 99214 OFFICE O/P EST MOD 30 MIN: CPT

## 2023-01-20 RX ORDER — MELOXICAM 15 MG/1
15 TABLET ORAL
Qty: 30 | Refills: 0 | Status: ACTIVE | COMMUNITY
Start: 2022-08-01 | End: 1900-01-01

## 2023-01-20 RX ORDER — MELOXICAM 15 MG/1
15 TABLET ORAL DAILY
Qty: 30 | Refills: 0 | Status: COMPLETED | COMMUNITY
Start: 2022-06-03 | End: 2023-01-20

## 2023-01-20 NOTE — HISTORY OF PRESENT ILLNESS
[de-identified] : 52-year-old woman had been treated by Dr. Muñiz for her ankle where she had a severe ankle sprain 7/14/2022 which was quite severe with a lot of bruising and swelling which sounds like it was more lateral than medial.. She has a history numerous ankle sprains aver the years. A month ago her ankle was hurting a lot and could hardly walk but its gotten somewhat better. Pain is 3/10 that she describes as intermittent, achy and sharp.  She takes meloxicam about 3 times a week for the pain.  She had been given a prescription for 90 tablets about 4 months ago and is running out now.  She still has stiffness.\par She has pain every day which she thinks is in part related to her foot alignment.  She has seen a podiatrist who told her that there were a lot of issues with her feet.  Most of all she has high arches.  She got orthotics to help with the high arches in the frequent ankle sprains which she has had over the years.  The physical therapy has helped partially.\par \par She also had a left hand injury at the base of her middle finger. She cut herself with an oyster knife on December 24 when she was in [place]. She had a exploratory surgery the next day, [place], so they could check to make sure everything was intact which it was.  They were concerned about a possible neurovascular injury.  Apparently that was not found and the nerves appeared to be okay.  She has been having some tightness and cant fully extend her 3rd finger.  She also has some numbness near the incision which is lumpy and tender.  More distally in the finger she does have feeling and she is able to actively flex the finger.

## 2023-01-20 NOTE — ASSESSMENT
[FreeTextEntry1] : 52-year-old woman comes in with 2 issues.  She is here for follow-up for her left ankle sprain that occurred July 22 and had been treated initially by Dr. Muñiz.  She still has stiffness and pain in the ankle.  She is still taking NSAIDs for the pain.\par She is walking better and overall her function is good despite the pain.  She also has had some chronic foot and ankle pain bilaterally and has had recurrent ankle sprains bilaterally.  Underlying cavus alignment puts her at increased risk of ankle sprains.\par Right now her left ankle feels more stiff than unstable but with the stiffness she may not be able to accommodate to the ground as well and could still be at increased risk of an ankle sprain.\par She also has equinus contracture.\par She will continue with physical therapy for the ankle which is helping and I encouraged her to do stretching and strengthening and balance at home as well.  Meloxicam was renewed to take if needed for pain.\par If she is continuing to have pain I would recommend getting an MRI to evaluate her ankle further for any associated injuries beyond the ankle sprain/ligament tears that would be expected.  With her alignment she can be prone to peroneal tendon issues.  Osteochondral lesions can also occur when patients have recurrent injury or sprains of their ankle.\par She can do warm soaks and ice if needed.  She is wearing sneakers still since the injury and understandably would like to be able to wear other shoes but when she tried that a month ago it caused a lot more pain.\par I will see her back with an MRI in about 6 weeks or so if she is continuing to have pain and otherwise she will follow-up after doing the therapy.\par \par She lacerated her left hand near the palmar middle finger MP joint.  An exploration of the wound was performed in [place] and no neurovascular or tendon injury was found.  She does have some decreased sensation around the laceration but not distally in the finger.  The tendon clinically is intact but there is stiffness and some contracture.  She was referred to OT/hand therapy to try to rehabilitate this.  She could try to use some silicone gel pad which may help soften the scar.\par Massage may help with desensitization and tissue mobilization.\par If she is having significant issues I would recommend following up with a hand specialist but otherwise I can check it when she comes back in for follow-up to make sure it is improving.\par

## 2023-01-20 NOTE — PHYSICAL EXAM
[UE/LE] : Motor: 5/5 motor strength in bilateral upper & lower extremities [Normal RUE] : Right Upper Extremity: No scars, rashes, lesions, ulcers, skin intact [Normal LUE] : Left Upper Extremity: No scars, rashes, lesions, ulcers, skin intact [Normal RLE] : Right Lower Extremity: No scars, rashes, lesions, ulcers, skin intact [Normal LLE] : Left Lower Extremity: No scars, rashes, lesions, ulcers, skin intact [Normal Touch] : sensation intact for touch [Normal] : Gait: normal [LE] : Sensory: Intact in bilateral lower extremities [de-identified] : LEFT ankle\par Nonantalgic gait.  Cavovarus alignment bilaterally.\par She can walk on her heels and toes without difficulty.\par Tight gastrocnemius left greater than right with 0 degrees dorsiflexion with the knee extended.\par Mild tenderness around the anterolateral ankle.  Nontender medial ankle.  There is tenderness near the medial arch, first medial tarsometatarsal joint.\par Nontender on the anterior tibial tendon, gastrocsoleus, peroneals, posterior tibial tendon, Achilles.\par No edema, ecchymoses, erythema.\par Sensation is intact throughout.\par Normal capillary refill.  Feet are warm.\par On anterolateral drawer and varus tilt there is decreased motion left compared to the right.\par \par LEFT hand\par There is well-healed laceration/incision near the palmar crease middle finger MP joint.  Scar tissue is somewhat proud and tender.\par Distally sensation is intact in the finger but decreased near the incision.\par She can actively flex and extend the finger.  There is a mild flexion contracture at the MP joint when the fingers fully extended compared to the other fingers.\par Normal capillary refill. [de-identified] : \par Weightbearing x-rays 3 views of the left ankle on 8/1/2022 showed an os peroneum, small lucency anterior process calcaneus, no fractures and intact ankle mortise and pes cavus alignment.  No fractures seen.\par \par There were prior left foot x-rays from October 28, 2019 with bipartite tibial sesamoid and osteophytes fibular sesamoid and mild MTP joint narrowing.  Bilateral os perineum's with lucency or some fragmentation.

## 2023-02-09 ENCOUNTER — NON-APPOINTMENT (OUTPATIENT)
Age: 53
End: 2023-02-09

## 2023-08-06 ENCOUNTER — NON-APPOINTMENT (OUTPATIENT)
Age: 53
End: 2023-08-06

## 2023-08-06 ENCOUNTER — RESULT REVIEW (OUTPATIENT)
Age: 53
End: 2023-08-06

## 2023-08-07 ENCOUNTER — TRANSCRIPTION ENCOUNTER (OUTPATIENT)
Age: 53
End: 2023-08-07

## 2023-09-08 ENCOUNTER — APPOINTMENT (OUTPATIENT)
Dept: MAMMOGRAPHY | Facility: CLINIC | Age: 53
End: 2023-09-08
Payer: COMMERCIAL

## 2023-09-08 ENCOUNTER — APPOINTMENT (OUTPATIENT)
Dept: ULTRASOUND IMAGING | Facility: CLINIC | Age: 53
End: 2023-09-08
Payer: COMMERCIAL

## 2023-09-08 ENCOUNTER — RESULT REVIEW (OUTPATIENT)
Age: 53
End: 2023-09-08

## 2023-09-08 ENCOUNTER — OUTPATIENT (OUTPATIENT)
Dept: OUTPATIENT SERVICES | Facility: HOSPITAL | Age: 53
LOS: 1 days | End: 2023-09-08

## 2023-09-08 PROCEDURE — G0279: CPT | Mod: 26

## 2023-09-08 PROCEDURE — 77066 DX MAMMO INCL CAD BI: CPT | Mod: 26

## 2023-09-08 PROCEDURE — 76641 ULTRASOUND BREAST COMPLETE: CPT | Mod: 26,50

## 2023-09-10 ENCOUNTER — NON-APPOINTMENT (OUTPATIENT)
Age: 53
End: 2023-09-10

## 2023-09-19 ENCOUNTER — NON-APPOINTMENT (OUTPATIENT)
Age: 53
End: 2023-09-19

## 2023-09-19 ENCOUNTER — APPOINTMENT (OUTPATIENT)
Dept: FAMILY MEDICINE | Facility: CLINIC | Age: 53
End: 2023-09-19
Payer: COMMERCIAL

## 2023-09-19 VITALS
SYSTOLIC BLOOD PRESSURE: 102 MMHG | HEIGHT: 63 IN | WEIGHT: 131 LBS | HEART RATE: 64 BPM | TEMPERATURE: 97.3 F | OXYGEN SATURATION: 99 % | DIASTOLIC BLOOD PRESSURE: 62 MMHG | BODY MASS INDEX: 23.21 KG/M2

## 2023-09-19 DIAGNOSIS — Z00.00 ENCOUNTER FOR GENERAL ADULT MEDICAL EXAMINATION W/OUT ABNORMAL FINDINGS: ICD-10-CM

## 2023-09-19 PROCEDURE — 93000 ELECTROCARDIOGRAM COMPLETE: CPT

## 2023-09-19 PROCEDURE — 36415 COLL VENOUS BLD VENIPUNCTURE: CPT

## 2023-09-19 PROCEDURE — 99396 PREV VISIT EST AGE 40-64: CPT | Mod: 25

## 2023-09-20 LAB
25(OH)D3 SERPL-MCNC: 34.4 NG/ML
ALBUMIN SERPL ELPH-MCNC: 4.6 G/DL
ALP BLD-CCNC: 37 U/L
ALT SERPL-CCNC: 19 U/L
ANION GAP SERPL CALC-SCNC: 9 MMOL/L
APPEARANCE: CLEAR
AST SERPL-CCNC: 20 U/L
BACTERIA: ABNORMAL /HPF
BASOPHILS # BLD AUTO: 0.09 K/UL
BASOPHILS NFR BLD AUTO: 1.2 %
BILIRUB SERPL-MCNC: 0.5 MG/DL
BILIRUBIN URINE: NEGATIVE
BLOOD URINE: NEGATIVE
BUN SERPL-MCNC: 10 MG/DL
CALCIUM SERPL-MCNC: 9.8 MG/DL
CAST: 0 /LPF
CHLORIDE SERPL-SCNC: 105 MMOL/L
CHOLEST SERPL-MCNC: 131 MG/DL
CO2 SERPL-SCNC: 24 MMOL/L
COLOR: YELLOW
CREAT SERPL-MCNC: 0.75 MG/DL
EGFR: 96 ML/MIN/1.73M2
EOSINOPHIL # BLD AUTO: 0.09 K/UL
EOSINOPHIL NFR BLD AUTO: 1.2 %
EPITHELIAL CELLS: 8 /HPF
ESTIMATED AVERAGE GLUCOSE: 105 MG/DL
FOLATE SERPL-MCNC: 12.9 NG/ML
GLUCOSE QUALITATIVE U: NEGATIVE MG/DL
GLUCOSE SERPL-MCNC: 95 MG/DL
HAV IGM SER QL: NONREACTIVE
HBA1C MFR BLD HPLC: 5.3 %
HBV CORE IGM SER QL: NONREACTIVE
HBV SURFACE AG SER QL: NONREACTIVE
HCT VFR BLD CALC: 36.9 %
HCV AB SER QL: NONREACTIVE
HCV S/CO RATIO: 0.07 S/CO
HDLC SERPL-MCNC: 68 MG/DL
HGB BLD-MCNC: 11.7 G/DL
HIV1+2 AB SPEC QL IA.RAPID: NONREACTIVE
IMM GRANULOCYTES NFR BLD AUTO: 0.1 %
KETONES URINE: NEGATIVE MG/DL
LDLC SERPL CALC-MCNC: 53 MG/DL
LEUKOCYTE ESTERASE URINE: ABNORMAL
LYMPHOCYTES # BLD AUTO: 2.03 K/UL
LYMPHOCYTES NFR BLD AUTO: 26.1 %
MAN DIFF?: NORMAL
MCHC RBC-ENTMCNC: 31.7 GM/DL
MCHC RBC-ENTMCNC: 32.1 PG
MCV RBC AUTO: 101.4 FL
MICROSCOPIC-UA: NORMAL
MONOCYTES # BLD AUTO: 0.69 K/UL
MONOCYTES NFR BLD AUTO: 8.9 %
NEUTROPHILS # BLD AUTO: 4.86 K/UL
NEUTROPHILS NFR BLD AUTO: 62.5 %
NITRITE URINE: NEGATIVE
NONHDLC SERPL-MCNC: 63 MG/DL
PH URINE: 5.5
PLATELET # BLD AUTO: 329 K/UL
POTASSIUM SERPL-SCNC: 4.9 MMOL/L
PROT SERPL-MCNC: 6.8 G/DL
PROTEIN URINE: NEGATIVE MG/DL
RBC # BLD: 3.64 M/UL
RBC # FLD: 12.2 %
RED BLOOD CELLS URINE: 1 /HPF
REVIEW: NORMAL
SODIUM SERPL-SCNC: 139 MMOL/L
SPECIFIC GRAVITY URINE: 1.02
T PALLIDUM AB SER QL IA: NEGATIVE
T4 FREE SERPL-MCNC: 0.9 NG/DL
TRIGL SERPL-MCNC: 42 MG/DL
TSH SERPL-ACNC: 3.26 UIU/ML
UROBILINOGEN URINE: 0.2 MG/DL
VIT B12 SERPL-MCNC: 364 PG/ML
WBC # FLD AUTO: 7.77 K/UL
WHITE BLOOD CELLS URINE: 1 /HPF

## 2023-09-22 ENCOUNTER — APPOINTMENT (OUTPATIENT)
Dept: OBGYN | Facility: CLINIC | Age: 53
End: 2023-09-22
Payer: COMMERCIAL

## 2023-09-22 VITALS — DIASTOLIC BLOOD PRESSURE: 69 MMHG | SYSTOLIC BLOOD PRESSURE: 103 MMHG | HEART RATE: 59 BPM | OXYGEN SATURATION: 99 %

## 2023-09-22 DIAGNOSIS — Z01.419 ENCOUNTER FOR GYNECOLOGICAL EXAMINATION (GENERAL) (ROUTINE) W/OUT ABNORMAL FINDINGS: ICD-10-CM

## 2023-09-22 PROCEDURE — 99396 PREV VISIT EST AGE 40-64: CPT

## 2023-09-24 ENCOUNTER — NON-APPOINTMENT (OUTPATIENT)
Age: 53
End: 2023-09-24

## 2023-09-24 LAB — HPV HIGH+LOW RISK DNA PNL CVX: NOT DETECTED

## 2023-09-25 ENCOUNTER — TRANSCRIPTION ENCOUNTER (OUTPATIENT)
Age: 53
End: 2023-09-25

## 2023-09-26 ENCOUNTER — NON-APPOINTMENT (OUTPATIENT)
Age: 53
End: 2023-09-26

## 2023-09-27 ENCOUNTER — TRANSCRIPTION ENCOUNTER (OUTPATIENT)
Age: 53
End: 2023-09-27

## 2023-09-28 LAB — CYTOLOGY CVX/VAG DOC THIN PREP: NORMAL

## 2023-12-18 ENCOUNTER — TRANSCRIPTION ENCOUNTER (OUTPATIENT)
Age: 53
End: 2023-12-18

## 2023-12-28 ENCOUNTER — NON-APPOINTMENT (OUTPATIENT)
Age: 53
End: 2023-12-28

## 2024-01-02 ENCOUNTER — TRANSCRIPTION ENCOUNTER (OUTPATIENT)
Age: 54
End: 2024-01-02

## 2024-01-03 ENCOUNTER — APPOINTMENT (OUTPATIENT)
Dept: FAMILY MEDICINE | Facility: CLINIC | Age: 54
End: 2024-01-03
Payer: COMMERCIAL

## 2024-01-03 PROCEDURE — 99213 OFFICE O/P EST LOW 20 MIN: CPT | Mod: 95

## 2024-01-03 NOTE — HISTORY OF PRESENT ILLNESS
[Home] : at home, [unfilled] , at the time of the visit. [Other Location: e.g. Home (Enter Location, City,State)___] : at [unfilled] [Verbal consent obtained from patient] : the patient, [unfilled] [de-identified] : 54 yo female here for follow up for depression. 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 Has been feeling more depression recently. Does therapy regularly, is trying to make steps to improve mood but interested in increasing Wellbutrin. Does notice less motivation to do things she enjoys such as reading at night.

## 2024-02-07 ENCOUNTER — TRANSCRIPTION ENCOUNTER (OUTPATIENT)
Age: 54
End: 2024-02-07

## 2024-02-08 ENCOUNTER — NON-APPOINTMENT (OUTPATIENT)
Age: 54
End: 2024-02-08

## 2024-02-09 ENCOUNTER — TRANSCRIPTION ENCOUNTER (OUTPATIENT)
Age: 54
End: 2024-02-09

## 2024-02-15 ENCOUNTER — NON-APPOINTMENT (OUTPATIENT)
Age: 54
End: 2024-02-15

## 2024-02-16 ENCOUNTER — TRANSCRIPTION ENCOUNTER (OUTPATIENT)
Age: 54
End: 2024-02-16

## 2024-02-16 RX ORDER — BUPROPION HYDROCHLORIDE 150 MG/1
150 TABLET, EXTENDED RELEASE ORAL
Qty: 30 | Refills: 1 | Status: ACTIVE | COMMUNITY
Start: 2020-11-09 | End: 1900-01-01

## 2024-02-21 ENCOUNTER — APPOINTMENT (OUTPATIENT)
Dept: FAMILY MEDICINE | Facility: CLINIC | Age: 54
End: 2024-02-21
Payer: COMMERCIAL

## 2024-02-21 DIAGNOSIS — F32.A DEPRESSION, UNSPECIFIED: ICD-10-CM

## 2024-02-21 DIAGNOSIS — R51.9 HEADACHE, UNSPECIFIED: ICD-10-CM

## 2024-02-21 PROCEDURE — 99214 OFFICE O/P EST MOD 30 MIN: CPT | Mod: 95

## 2024-02-21 NOTE — HISTORY OF PRESENT ILLNESS
[Home] : at home, [unfilled] , at the time of the visit. [Other Location: e.g. Home (Enter Location, City,State)___] : at [unfilled] [Verbal consent obtained from patient] : the patient, [unfilled] [de-identified] : 52 yo female here for follow up for depression. 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 Does therapy regularly, is trying to make steps to improve mood. Tried to increased wellbutrin but felt it made her more agitated and edgy. She didn't feel like herself and anxiety was worse. Admitting to depression some days, but able to work and function well. We decreased her down to 150 mg from 300 mg wellbutrin over the last few days.  Has been having headaches since switching down to 150 mg. Headaches aren't severe. Today she took excedrin and ibuprofen with some improvement. Has been drinking a lot of water.

## 2024-04-18 ENCOUNTER — NON-APPOINTMENT (OUTPATIENT)
Age: 54
End: 2024-04-18

## 2024-04-19 ENCOUNTER — TRANSCRIPTION ENCOUNTER (OUTPATIENT)
Age: 54
End: 2024-04-19

## 2024-04-19 RX ORDER — BUPROPION HYDROCHLORIDE 100 MG/1
100 TABLET, FILM COATED, EXTENDED RELEASE ORAL
Qty: 120 | Refills: 0 | Status: ACTIVE | COMMUNITY
Start: 2024-04-19 | End: 1900-01-01

## 2024-05-01 ENCOUNTER — APPOINTMENT (OUTPATIENT)
Dept: ORTHOPEDIC SURGERY | Facility: CLINIC | Age: 54
End: 2024-05-01
Payer: COMMERCIAL

## 2024-05-01 VITALS — HEIGHT: 63 IN | WEIGHT: 131 LBS | BODY MASS INDEX: 23.21 KG/M2 | RESPIRATION RATE: 16 BRPM

## 2024-05-01 DIAGNOSIS — M53.3 SACROCOCCYGEAL DISORDERS, NOT ELSEWHERE CLASSIFIED: ICD-10-CM

## 2024-05-01 PROCEDURE — 99213 OFFICE O/P EST LOW 20 MIN: CPT

## 2024-05-01 PROCEDURE — 72190 X-RAY EXAM OF PELVIS: CPT

## 2024-05-01 RX ORDER — MELOXICAM 15 MG/1
15 TABLET ORAL DAILY
Qty: 1 | Refills: 1 | Status: ACTIVE | COMMUNITY
Start: 2024-05-01 | End: 1900-01-01

## 2024-07-03 ENCOUNTER — TRANSCRIPTION ENCOUNTER (OUTPATIENT)
Age: 54
End: 2024-07-03

## 2024-07-30 ENCOUNTER — TRANSCRIPTION ENCOUNTER (OUTPATIENT)
Age: 54
End: 2024-07-30

## 2024-07-31 ENCOUNTER — TRANSCRIPTION ENCOUNTER (OUTPATIENT)
Age: 54
End: 2024-07-31

## 2024-07-31 ENCOUNTER — NON-APPOINTMENT (OUTPATIENT)
Age: 54
End: 2024-07-31

## 2024-08-01 ENCOUNTER — NON-APPOINTMENT (OUTPATIENT)
Age: 54
End: 2024-08-01

## 2024-08-01 ENCOUNTER — APPOINTMENT (OUTPATIENT)
Dept: FAMILY MEDICINE | Facility: CLINIC | Age: 54
End: 2024-08-01
Payer: COMMERCIAL

## 2024-08-01 DIAGNOSIS — B37.9 CANDIDIASIS, UNSPECIFIED: ICD-10-CM

## 2024-08-01 PROCEDURE — 99213 OFFICE O/P EST LOW 20 MIN: CPT

## 2024-08-01 PROCEDURE — G2211 COMPLEX E/M VISIT ADD ON: CPT

## 2024-08-01 RX ORDER — FLUCONAZOLE 150 MG/1
150 TABLET ORAL
Qty: 1 | Refills: 1 | Status: ACTIVE | COMMUNITY
Start: 2024-08-01 | End: 1900-01-01

## 2024-08-01 NOTE — HISTORY OF PRESENT ILLNESS
[FreeTextEntry8] : 52 yo female here for acute visit. Reporting some spotting and discharge after recent sexual encounter with new sexual partner. Last intercourse was about a year ago, is unsure if may be component of vaginal atrophy. Still having regular menses, last about 10 days ago. Some itching and irritation as well. No urinary symptoms or abs pain. No fever or chills reported.

## 2024-08-01 NOTE — PHYSICAL EXAM
[No Acute Distress] : no acute distress [Normal Sclera/Conjunctiva] : normal sclera/conjunctiva [Normal Outer Ear/Nose] : the outer ears and nose were normal in appearance [Supple] : supple [No Respiratory Distress] : no respiratory distress  [No Edema] : there was no peripheral edema [No Joint Swelling] : no joint swelling [No Rash] : no rash [Coordination Grossly Intact] : coordination grossly intact [Normal Insight/Judgement] : insight and judgment were intact

## 2024-08-04 PROBLEM — A49.8 GARDNERELLA INFECTION: Status: ACTIVE | Noted: 2024-08-04

## 2024-08-04 LAB
APPEARANCE: CLEAR
BILIRUBIN URINE: NEGATIVE
BLOOD URINE: NEGATIVE
C TRACH RRNA SPEC QL NAA+PROBE: NOT DETECTED
CANDIDA VAG CYTO: NOT DETECTED
COLOR: YELLOW
G VAGINALIS+PREV SP MTYP VAG QL MICRO: DETECTED
GLUCOSE QUALITATIVE U: NEGATIVE MG/DL
KETONES URINE: NEGATIVE MG/DL
LEUKOCYTE ESTERASE URINE: NEGATIVE
N GONORRHOEA RRNA SPEC QL NAA+PROBE: NOT DETECTED
NITRITE URINE: NEGATIVE
PH URINE: 7.5
PROTEIN URINE: NEGATIVE MG/DL
SOURCE AMPLIFICATION: NORMAL
SPECIFIC GRAVITY URINE: 1.01
T VAGINALIS VAG QL WET PREP: NOT DETECTED
UROBILINOGEN URINE: 0.2 MG/DL

## 2024-08-08 ENCOUNTER — APPOINTMENT (OUTPATIENT)
Dept: INTERNAL MEDICINE | Facility: CLINIC | Age: 54
End: 2024-08-08

## 2024-08-08 ENCOUNTER — NON-APPOINTMENT (OUTPATIENT)
Age: 54
End: 2024-08-08

## 2024-08-08 PROBLEM — Z11.3 SCREENING FOR STD (SEXUALLY TRANSMITTED DISEASE): Status: ACTIVE | Noted: 2024-08-08

## 2024-08-08 PROCEDURE — 36415 COLL VENOUS BLD VENIPUNCTURE: CPT

## 2024-08-08 PROCEDURE — 99214 OFFICE O/P EST MOD 30 MIN: CPT | Mod: 25

## 2024-08-08 NOTE — PHYSICAL EXAM
[No Acute Distress] : no acute distress [Well Nourished] : well nourished [Well Developed] : well developed [No Respiratory Distress] : no respiratory distress  [No Accessory Muscle Use] : no accessory muscle use [Clear to Auscultation] : lungs were clear to auscultation bilaterally [Normal Rate] : normal rate  [Regular Rhythm] : with a regular rhythm [Soft] : abdomen soft [Normal Bowel Sounds] : normal bowel sounds [Normal Gait] : normal gait [Speech Grossly Normal] : speech grossly normal [Normal Affect] : the affect was normal [Normal Mood] : the mood was normal [Normal Insight/Judgement] : insight and judgment were intact [No JVD] : no jugular venous distention [de-identified] : appears malaised, fatigued  [de-identified] : lymph nodes of neck slightly swollen- declined US- likely reactive secondary to her symptoms

## 2024-08-08 NOTE — PHYSICAL EXAM
[No Acute Distress] : no acute distress [Well Nourished] : well nourished [Well Developed] : well developed [No Respiratory Distress] : no respiratory distress  [No Accessory Muscle Use] : no accessory muscle use [Clear to Auscultation] : lungs were clear to auscultation bilaterally [Normal Rate] : normal rate  [Regular Rhythm] : with a regular rhythm [Soft] : abdomen soft [Normal Bowel Sounds] : normal bowel sounds [Normal Gait] : normal gait [Speech Grossly Normal] : speech grossly normal [Normal Affect] : the affect was normal [Normal Mood] : the mood was normal [Normal Insight/Judgement] : insight and judgment were intact [No JVD] : no jugular venous distention [de-identified] : appears malaised, fatigued  [de-identified] : lymph nodes of neck slightly swollen- declined US- likely reactive secondary to her symptoms

## 2024-08-08 NOTE — HISTORY OF PRESENT ILLNESS
[FreeTextEntry1] : -sore throat -STD testing [de-identified] : Pt presents with sore throat, body aches, fevers, chills, malaise, for the last 2 days. She took several rapid covid tests which were negative. She denies headache, night sweats, weight loss, cough, chest pain, sob, palpitations, UTI symptoms, abdominal pain, diarrhea, nausea, vomiting, constipation, rash. She was recently sexually active with a man who was a stranger in [place] and would like STD screening today, she admits to receptive oral intercourse. She denies any other symptoms and feels fine otherwise.

## 2024-08-08 NOTE — HISTORY OF PRESENT ILLNESS
[FreeTextEntry1] : -sore throat -STD testing [de-identified] : Pt presents with sore throat, body aches, fevers, chills, malaise, for the last 2 days. She took several rapid covid tests which were negative. She denies headache, night sweats, weight loss, cough, chest pain, sob, palpitations, UTI symptoms, abdominal pain, diarrhea, nausea, vomiting, constipation, rash. She was recently sexually active with a man who was a stranger in [place] and would like STD screening today, she admits to receptive oral intercourse. She denies any other symptoms and feels fine otherwise.

## 2024-08-09 ENCOUNTER — TRANSCRIPTION ENCOUNTER (OUTPATIENT)
Age: 54
End: 2024-08-09

## 2024-08-09 PROBLEM — R59.9 SWOLLEN LYMPH NODES: Status: ACTIVE | Noted: 2024-08-08

## 2024-08-09 PROBLEM — J02.9 SORE THROAT: Status: ACTIVE | Noted: 2024-08-08 | Resolved: 2024-09-07

## 2024-08-11 ENCOUNTER — NON-APPOINTMENT (OUTPATIENT)
Age: 54
End: 2024-08-11

## 2024-08-12 ENCOUNTER — NON-APPOINTMENT (OUTPATIENT)
Age: 54
End: 2024-08-12

## 2024-08-12 ENCOUNTER — TRANSCRIPTION ENCOUNTER (OUTPATIENT)
Age: 54
End: 2024-08-12

## 2024-08-14 ENCOUNTER — APPOINTMENT (OUTPATIENT)
Dept: FAMILY MEDICINE | Facility: CLINIC | Age: 54
End: 2024-08-14
Payer: COMMERCIAL

## 2024-08-14 DIAGNOSIS — R19.7 DIARRHEA, UNSPECIFIED: ICD-10-CM

## 2024-08-14 DIAGNOSIS — J02.9 ACUTE PHARYNGITIS, UNSPECIFIED: ICD-10-CM

## 2024-08-14 PROCEDURE — 99214 OFFICE O/P EST MOD 30 MIN: CPT | Mod: 95

## 2024-08-14 PROCEDURE — G2211 COMPLEX E/M VISIT ADD ON: CPT

## 2024-08-14 NOTE — HISTORY OF PRESENT ILLNESS
[Home] : at home, [unfilled] , at the time of the visit. [Other Location: e.g. Home (Enter Location, City,State)___] : at [unfilled] [Verbal consent obtained from patient] : the patient, [unfilled] [FreeTextEntry8] : 53 yo female here for acute TEB. Started with cold symptoms about a week ago. Started with sore throat, then fever of 101.6. Saw NP in office Friday. Was having swollen lymph nodes as well. All testing and RVP negative, except for mono testing which was positive for reactivation/recent infection. Was feeling better, but yesterday symptoms got worse again. Still with swollen glands, throat swelling. Completed only 2 days of amoxicillin . Symptoms were improving with abx but discontinued once throat cx was negative.   Reports that she has been having diarrhea every time she has a BM 1-2 times daily for the last 5 days. Eating normally. Thinks it started once she had amoxacillin.

## 2024-08-20 ENCOUNTER — TRANSCRIPTION ENCOUNTER (OUTPATIENT)
Age: 54
End: 2024-08-20

## 2024-08-28 ENCOUNTER — TRANSCRIPTION ENCOUNTER (OUTPATIENT)
Age: 54
End: 2024-08-28

## 2024-08-28 DIAGNOSIS — K52.9 NONINFECTIVE GASTROENTERITIS AND COLITIS, UNSPECIFIED: ICD-10-CM

## 2024-08-29 ENCOUNTER — TRANSCRIPTION ENCOUNTER (OUTPATIENT)
Age: 54
End: 2024-08-29

## 2024-08-30 ENCOUNTER — RX RENEWAL (OUTPATIENT)
Age: 54
End: 2024-08-30

## 2024-09-03 ENCOUNTER — TRANSCRIPTION ENCOUNTER (OUTPATIENT)
Age: 54
End: 2024-09-03

## 2024-09-05 ENCOUNTER — NON-APPOINTMENT (OUTPATIENT)
Age: 54
End: 2024-09-05

## 2024-09-06 ENCOUNTER — TRANSCRIPTION ENCOUNTER (OUTPATIENT)
Age: 54
End: 2024-09-06

## 2024-09-06 LAB
DEPRECATED O AND P PREP STL: NORMAL
GI PCR PANEL: NOT DETECTED

## 2024-09-07 LAB — BACTERIA STL CULT: NORMAL

## 2024-09-09 ENCOUNTER — TRANSCRIPTION ENCOUNTER (OUTPATIENT)
Age: 54
End: 2024-09-09

## 2024-09-10 ENCOUNTER — TRANSCRIPTION ENCOUNTER (OUTPATIENT)
Age: 54
End: 2024-09-10

## 2024-09-10 DIAGNOSIS — A04.72 ENTEROCOLITIS DUE TO CLOSTRIDIUM DIFFICILE, NOT SPECIFIED AS RECURRENT: ICD-10-CM

## 2024-09-10 RX ORDER — VANCOMYCIN HYDROCHLORIDE 125 MG/1
125 CAPSULE ORAL
Qty: 40 | Refills: 0 | Status: ACTIVE | COMMUNITY
Start: 2024-09-10 | End: 1900-01-01

## 2024-09-11 ENCOUNTER — TRANSCRIPTION ENCOUNTER (OUTPATIENT)
Age: 54
End: 2024-09-11

## 2024-09-23 ENCOUNTER — RESULT REVIEW (OUTPATIENT)
Age: 54
End: 2024-09-23

## 2024-09-23 ENCOUNTER — APPOINTMENT (OUTPATIENT)
Dept: ULTRASOUND IMAGING | Facility: CLINIC | Age: 54
End: 2024-09-23
Payer: COMMERCIAL

## 2024-09-23 ENCOUNTER — OUTPATIENT (OUTPATIENT)
Dept: OUTPATIENT SERVICES | Facility: HOSPITAL | Age: 54
LOS: 1 days | End: 2024-09-23

## 2024-09-23 ENCOUNTER — APPOINTMENT (OUTPATIENT)
Dept: MAMMOGRAPHY | Facility: CLINIC | Age: 54
End: 2024-09-23
Payer: COMMERCIAL

## 2024-09-23 PROCEDURE — 76641 ULTRASOUND BREAST COMPLETE: CPT | Mod: 26,50

## 2024-09-23 PROCEDURE — 77063 BREAST TOMOSYNTHESIS BI: CPT | Mod: 26

## 2024-09-23 PROCEDURE — 77067 SCR MAMMO BI INCL CAD: CPT | Mod: 26

## 2024-09-24 ENCOUNTER — EMERGENCY (EMERGENCY)
Facility: HOSPITAL | Age: 54
LOS: 1 days | Discharge: HOME | End: 2024-09-24
Attending: EMERGENCY MEDICINE | Admitting: EMERGENCY MEDICINE
Payer: COMMERCIAL

## 2024-09-24 ENCOUNTER — APPOINTMENT (OUTPATIENT)
Dept: OBGYN | Facility: CLINIC | Age: 54
End: 2024-09-24

## 2024-09-24 ENCOUNTER — TRANSCRIPTION ENCOUNTER (OUTPATIENT)
Age: 54
End: 2024-09-24

## 2024-09-24 VITALS
TEMPERATURE: 98 F | HEART RATE: 73 BPM | DIASTOLIC BLOOD PRESSURE: 62 MMHG | OXYGEN SATURATION: 100 % | SYSTOLIC BLOOD PRESSURE: 103 MMHG | HEIGHT: 63 IN | RESPIRATION RATE: 17 BRPM | WEIGHT: 130.07 LBS

## 2024-09-24 DIAGNOSIS — Z88.5 ALLERGY STATUS TO NARCOTIC AGENT: ICD-10-CM

## 2024-09-24 DIAGNOSIS — R19.7 DIARRHEA, UNSPECIFIED: ICD-10-CM

## 2024-09-24 DIAGNOSIS — Z87.19 PERSONAL HISTORY OF OTHER DISEASES OF THE DIGESTIVE SYSTEM: ICD-10-CM

## 2024-09-24 DIAGNOSIS — K52.9 NONINFECTIVE GASTROENTERITIS AND COLITIS, UNSPECIFIED: ICD-10-CM

## 2024-09-24 LAB
ALBUMIN SERPL ELPH-MCNC: 3.7 G/DL — SIGNIFICANT CHANGE UP (ref 3.4–5)
ALP SERPL-CCNC: 40 U/L — SIGNIFICANT CHANGE UP (ref 40–120)
ALT FLD-CCNC: 20 U/L — SIGNIFICANT CHANGE UP (ref 12–42)
ANION GAP SERPL CALC-SCNC: 9 MMOL/L — SIGNIFICANT CHANGE UP (ref 9–16)
APPEARANCE UR: CLEAR — SIGNIFICANT CHANGE UP
AST SERPL-CCNC: 15 U/L — SIGNIFICANT CHANGE UP (ref 15–37)
BASOPHILS # BLD AUTO: 0.05 K/UL — SIGNIFICANT CHANGE UP (ref 0–0.2)
BASOPHILS NFR BLD AUTO: 0.7 % — SIGNIFICANT CHANGE UP (ref 0–2)
BILIRUB SERPL-MCNC: 0.8 MG/DL — SIGNIFICANT CHANGE UP (ref 0.2–1.2)
BILIRUB UR-MCNC: NEGATIVE — SIGNIFICANT CHANGE UP
BUN SERPL-MCNC: 10 MG/DL — SIGNIFICANT CHANGE UP (ref 7–23)
CALCIUM SERPL-MCNC: 8.8 MG/DL — SIGNIFICANT CHANGE UP (ref 8.5–10.5)
CHLORIDE SERPL-SCNC: 103 MMOL/L — SIGNIFICANT CHANGE UP (ref 96–108)
CO2 SERPL-SCNC: 26 MMOL/L — SIGNIFICANT CHANGE UP (ref 22–31)
COLOR SPEC: YELLOW — SIGNIFICANT CHANGE UP
CREAT SERPL-MCNC: 0.77 MG/DL — SIGNIFICANT CHANGE UP (ref 0.5–1.3)
DIFF PNL FLD: NEGATIVE — SIGNIFICANT CHANGE UP
EGFR: 93 ML/MIN/1.73M2 — SIGNIFICANT CHANGE UP
EOSINOPHIL # BLD AUTO: 0.05 K/UL — SIGNIFICANT CHANGE UP (ref 0–0.5)
EOSINOPHIL NFR BLD AUTO: 0.7 % — SIGNIFICANT CHANGE UP (ref 0–6)
FLUAV AG NPH QL: SIGNIFICANT CHANGE UP
FLUBV AG NPH QL: SIGNIFICANT CHANGE UP
GLUCOSE SERPL-MCNC: 79 MG/DL — SIGNIFICANT CHANGE UP (ref 70–99)
GLUCOSE UR QL: NEGATIVE MG/DL — SIGNIFICANT CHANGE UP
HCG UR QL: NEGATIVE — SIGNIFICANT CHANGE UP
HCT VFR BLD CALC: 36.5 % — SIGNIFICANT CHANGE UP (ref 34.5–45)
HGB BLD-MCNC: 11.9 G/DL — SIGNIFICANT CHANGE UP (ref 11.5–15.5)
IMM GRANULOCYTES NFR BLD AUTO: 0.1 % — SIGNIFICANT CHANGE UP (ref 0–0.9)
KETONES UR-MCNC: NEGATIVE MG/DL — SIGNIFICANT CHANGE UP
LEUKOCYTE ESTERASE UR-ACNC: NEGATIVE — SIGNIFICANT CHANGE UP
LYMPHOCYTES # BLD AUTO: 1.63 K/UL — SIGNIFICANT CHANGE UP (ref 1–3.3)
LYMPHOCYTES # BLD AUTO: 24.4 % — SIGNIFICANT CHANGE UP (ref 13–44)
MAGNESIUM SERPL-MCNC: 1.4 MG/DL — LOW (ref 1.6–2.6)
MCHC RBC-ENTMCNC: 31.7 PG — SIGNIFICANT CHANGE UP (ref 27–34)
MCHC RBC-ENTMCNC: 32.6 GM/DL — SIGNIFICANT CHANGE UP (ref 32–36)
MCV RBC AUTO: 97.3 FL — SIGNIFICANT CHANGE UP (ref 80–100)
MONOCYTES # BLD AUTO: 0.65 K/UL — SIGNIFICANT CHANGE UP (ref 0–0.9)
MONOCYTES NFR BLD AUTO: 9.7 % — SIGNIFICANT CHANGE UP (ref 2–14)
NEUTROPHILS # BLD AUTO: 4.29 K/UL — SIGNIFICANT CHANGE UP (ref 1.8–7.4)
NEUTROPHILS NFR BLD AUTO: 64.4 % — SIGNIFICANT CHANGE UP (ref 43–77)
NITRITE UR-MCNC: NEGATIVE — SIGNIFICANT CHANGE UP
NRBC # BLD: 0 /100 WBCS — SIGNIFICANT CHANGE UP (ref 0–0)
PH UR: 6 — SIGNIFICANT CHANGE UP (ref 5–8)
PLATELET # BLD AUTO: 307 K/UL — SIGNIFICANT CHANGE UP (ref 150–400)
POTASSIUM SERPL-MCNC: 4.1 MMOL/L — SIGNIFICANT CHANGE UP (ref 3.5–5.3)
POTASSIUM SERPL-SCNC: 4.1 MMOL/L — SIGNIFICANT CHANGE UP (ref 3.5–5.3)
PROT SERPL-MCNC: 7.5 G/DL — SIGNIFICANT CHANGE UP (ref 6.4–8.2)
PROT UR-MCNC: NEGATIVE MG/DL — SIGNIFICANT CHANGE UP
RBC # BLD: 3.75 M/UL — LOW (ref 3.8–5.2)
RBC # FLD: 12.3 % — SIGNIFICANT CHANGE UP (ref 10.3–14.5)
RSV RNA NPH QL NAA+NON-PROBE: SIGNIFICANT CHANGE UP
SARS-COV-2 RNA SPEC QL NAA+PROBE: SIGNIFICANT CHANGE UP
SODIUM SERPL-SCNC: 138 MMOL/L — SIGNIFICANT CHANGE UP (ref 132–145)
SP GR SPEC: 1.01 — SIGNIFICANT CHANGE UP (ref 1–1.03)
UROBILINOGEN FLD QL: 0.2 MG/DL — SIGNIFICANT CHANGE UP (ref 0.2–1)
WBC # BLD: 6.68 K/UL — SIGNIFICANT CHANGE UP (ref 3.8–10.5)
WBC # FLD AUTO: 6.68 K/UL — SIGNIFICANT CHANGE UP (ref 3.8–10.5)

## 2024-09-24 PROCEDURE — 99285 EMERGENCY DEPT VISIT HI MDM: CPT

## 2024-09-24 PROCEDURE — 74177 CT ABD & PELVIS W/CONTRAST: CPT | Mod: 26,MC

## 2024-09-24 RX ORDER — KETOROLAC TROMETHAMINE 30 MG/ML
15 INJECTION, SOLUTION INTRAMUSCULAR ONCE
Refills: 0 | Status: DISCONTINUED | OUTPATIENT
Start: 2024-09-24 | End: 2024-09-24

## 2024-09-24 RX ORDER — IOHEXOL 350 MG/ML
30 INJECTION, SOLUTION INTRAVENOUS ONCE
Refills: 0 | Status: COMPLETED | OUTPATIENT
Start: 2024-09-24 | End: 2024-09-24

## 2024-09-24 RX ORDER — SODIUM CHLORIDE 9 MG/ML
1000 INJECTION INTRAMUSCULAR; INTRAVENOUS; SUBCUTANEOUS ONCE
Refills: 0 | Status: COMPLETED | OUTPATIENT
Start: 2024-09-24 | End: 2024-09-24

## 2024-09-24 RX ORDER — KETOROLAC TROMETHAMINE 30 MG/ML
30 INJECTION, SOLUTION INTRAMUSCULAR ONCE
Refills: 0 | Status: DISCONTINUED | OUTPATIENT
Start: 2024-09-24 | End: 2024-09-24

## 2024-09-24 RX ADMIN — IOHEXOL 30 MILLILITER(S): 350 INJECTION, SOLUTION INTRAVENOUS at 19:15

## 2024-09-24 RX ADMIN — Medication 25 GRAM(S): at 21:44

## 2024-09-24 RX ADMIN — KETOROLAC TROMETHAMINE 15 MILLIGRAM(S): 30 INJECTION, SOLUTION INTRAMUSCULAR at 19:16

## 2024-09-24 RX ADMIN — SODIUM CHLORIDE 2000 MILLILITER(S): 9 INJECTION INTRAMUSCULAR; INTRAVENOUS; SUBCUTANEOUS at 19:16

## 2024-09-24 NOTE — ED ADULT NURSE NOTE - NSFALLUNIVINTERV_ED_ALL_ED
Bed/Stretcher in lowest position, wheels locked, appropriate side rails in place/Call bell, personal items and telephone in reach/Instruct patient to call for assistance before getting out of bed/chair/stretcher/Non-slip footwear applied when patient is off stretcher/[place] to call system/Physically safe environment - no spills, clutter or unnecessary equipment/Purposeful proactive rounding/Room/bathroom lighting operational, light cord in reach

## 2024-09-24 NOTE — ED PROVIDER NOTE - OBJECTIVE STATEMENT
54-year-old female with a history of diarrhea for the past 8 weeks.  She was diagnosed with C. difficile on September 10 and just completed a 10-day course of vancomycin 2 days ago.  She has had persistent diarrhea since starting the antibiotics.  Over the past 2 days she has developed increased abdominal cramping and pain, mostly on the right side.  For the first time since having this diarrhea she felt too sick to go to work.  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    She called her PMD's office and they advised her to come to the ER for evaluation.  She is taking probiotics but has not yet seen a GI doctor.  She has not had any abdominal imaging up until this point as she has not had pain.  She had  fevers early in the course of this diarrheal illness but has not had any recently.  She thinks that she developed C. difficile after another GI bug requiring antibiotics.

## 2024-09-24 NOTE — ED PROVIDER NOTE - CLINICAL SUMMARY MEDICAL DECISION MAKING FREE TEXT BOX
54-year-old female presenting with increased abdominal pain and cramping as well as general malaise in the setting of a recently diagnosed C. difficile infection.  She just completed a 10-day course of antibiotics 2 days ago.  This coincides with when the symptoms worsen.  I suspect that she will require more prolonged course of antibiotics.  Will check screening labs as well as stool studies to exclude occult infection and to document recurrence.  Will also check a CT of the abdomen and pelvis with p.o. and IV contrast given her low BMI to evaluate for colitis or enteritis.

## 2024-09-24 NOTE — ED PROVIDER NOTE - CARE PROVIDERS DIRECT ADDRESSES
[email or web address]

## 2024-09-24 NOTE — ED PROVIDER NOTE - CARE PROVIDER_API CALL
Stephani Edwards  Gastroenterology  [address] [place] [address], [address]  [place] [address]  Phone: [phone]  Fax: [phone]  Follow Up Time:     Augusto Kern  Gastroenterology  [address]  [place] [address]  Phone: [phone]  Fax: [phone]  Follow Up Time:

## 2024-09-24 NOTE — ED ADULT NURSE NOTE - OBJECTIVE STATEMENT
aox3, breathing even and unlabored on RA c/o abd cramping, recently finished abx for cdiff. patients last BM was this morning. also c/o cough and fatigue

## 2024-09-24 NOTE — ED PROVIDER NOTE - NSFOLLOWUPINSTRUCTIONS_ED_ALL_ED_FT
You have a diffuse: Light of the us ascending and transverse colon.  It is possible that this is from C. difficile.  It is also possible that you have colitis of an inflammatory origin with a superimposed C. difficile infection.  I believe that you should start to see a GI specialist as your case is clearly complicated.  Your other labs do not support a serious C. difficile infection at this time, but this does not exclude C. difficile.    Given that you found minimal relief with vancomycin I am ordering fidaxomicin,  this is the old recommended medication for C. difficile.    If you have a loose stool over the next 24 hours you can bring it back and we will send it for analysis.  Should you develop any worsening symptoms such as increased pain, fevers or shaking chills please come back to the emergency department for intravenous antibiotics.

## 2024-09-24 NOTE — ED PROVIDER NOTE - PHYSICAL EXAMINATION
VITAL SIGNS: I have reviewed nursing notes and confirm.   CONST: Well-developed; well-nourished; No apparent distress.  ENT: No nasal discharge; airway clear. MMM  HEAD: Normocephalic; atraumatic.  EYES: Sclera clear. Pupils round and symmetrical bilaterally.  CARD: S1, S2 normal; no murmurs, gallops, or rubs. Regular rate and rhythm.  RESP: No wheezes, rales or rhonchi. Breathing comfortably; speaking in full sentences.   ABD: Soft; non-distended; + RUQ and RMQ tenderness; no rebound or guarding.  : No CVA tenderness bilaterally.  MSK: No acute deformities noted to extremities.   NEURO: Alert, oriented. Speech is fluent and appropriate. Moving all extremities appropriately. No gross abnormalities.   SKIN: Skin is normal temperature; no diaphoresis; no pallor.   PSYCH: Cooperative. Appropriate mood, language, and behavior.

## 2024-09-24 NOTE — ED PROVIDER NOTE - PROGRESS NOTE DETAILS
The patient feels ready to go.  Her labs are within normal limits.  She did not have a bowel movement so we were not able to send stool studies.  Her CT shows diffuse colitis of the ascending and transverse colon.  Given that she is feeling better, will use oral antibiotic. She feels that the vancomycin did not really help, Will try fidaxomicin.  I also suspect that there may be a component of inflammatory colitis given the low white count.

## 2024-09-24 NOTE — ED PROVIDER NOTE - PATIENT PORTAL LINK FT
You can access the FollowMyHealth Patient Portal offered by [facility] by registering at the following website: [email or web address]. By joining [email or web address]’s FollowMyHealth portal, you will also be able to view your health information using other applications (apps) compatible with our system.

## 2024-09-25 ENCOUNTER — TRANSCRIPTION ENCOUNTER (OUTPATIENT)
Age: 54
End: 2024-09-25

## 2024-09-25 VITALS
DIASTOLIC BLOOD PRESSURE: 71 MMHG | OXYGEN SATURATION: 98 % | HEART RATE: 62 BPM | SYSTOLIC BLOOD PRESSURE: 114 MMHG | TEMPERATURE: 98 F | RESPIRATION RATE: 16 BRPM

## 2024-09-25 RX ORDER — FIDAXOMICIN 200 MG/5ML
1 GRANULE, FOR SUSPENSION ORAL
Qty: 20 | Refills: 0
Start: 2024-09-25 | End: 2024-10-04

## 2024-09-25 RX ORDER — METRONIDAZOLE 250 MG
500 TABLET ORAL ONCE
Refills: 0 | Status: COMPLETED | OUTPATIENT
Start: 2024-09-25 | End: 2024-09-25

## 2024-09-25 RX ADMIN — Medication 500 MILLIGRAM(S): at 01:00

## 2024-09-25 NOTE — ED ADULT NURSE REASSESSMENT NOTE - NS ED NURSE REASSESS COMMENT FT1
Pt resting in stretcher comfortably, denies complaints at this time. Made aware or pending stool sample, denies urge to have BP at this time. Placed on cardiac monitoring, rpt VSS. Medication administered as per order. AAOx4. Care ongoing.
Patient reevaluated by MD; cleared for discharge. Patient alert verbal oriented x3; ambulatory w/ steady gait. Left ED safely without complaint. Discharge paperwork provided. patient to be expected to return tomorrow w/ stool specimen

## 2024-09-25 NOTE — ED POST DISCHARGE NOTE - ADDITIONAL DOCUMENTATION
Patient dropped her sample off to the ER, as per the labs request I canceled the tests that were ordered yesterday and reordered them.

## 2024-09-26 ENCOUNTER — TRANSCRIPTION ENCOUNTER (OUTPATIENT)
Age: 54
End: 2024-09-26

## 2024-09-26 LAB — GI PCR PANEL: SIGNIFICANT CHANGE UP

## 2024-09-27 ENCOUNTER — TRANSCRIPTION ENCOUNTER (OUTPATIENT)
Age: 54
End: 2024-09-27

## 2024-09-27 LAB
CULTURE RESULTS: SIGNIFICANT CHANGE UP
SPECIMEN SOURCE: SIGNIFICANT CHANGE UP

## 2024-09-29 ENCOUNTER — NON-APPOINTMENT (OUTPATIENT)
Age: 54
End: 2024-09-29

## 2024-09-30 ENCOUNTER — TRANSCRIPTION ENCOUNTER (OUTPATIENT)
Age: 54
End: 2024-09-30

## 2024-10-01 ENCOUNTER — NON-APPOINTMENT (OUTPATIENT)
Age: 54
End: 2024-10-01

## 2024-10-02 ENCOUNTER — TRANSCRIPTION ENCOUNTER (OUTPATIENT)
Age: 54
End: 2024-10-02

## 2024-10-02 ENCOUNTER — NON-APPOINTMENT (OUTPATIENT)
Age: 54
End: 2024-10-02

## 2024-10-02 LAB
CDIFF BY PCR: DETECTED
GI PCR PANEL: NOT DETECTED

## 2024-10-03 LAB — DEPRECATED O AND P PREP STL: NORMAL

## 2024-10-07 ENCOUNTER — TRANSCRIPTION ENCOUNTER (OUTPATIENT)
Age: 54
End: 2024-10-07

## 2024-10-09 ENCOUNTER — NON-APPOINTMENT (OUTPATIENT)
Age: 54
End: 2024-10-09

## 2024-10-09 ENCOUNTER — APPOINTMENT (OUTPATIENT)
Dept: GASTROENTEROLOGY | Facility: CLINIC | Age: 54
End: 2024-10-09
Payer: COMMERCIAL

## 2024-10-09 VITALS
HEART RATE: 60 BPM | BODY MASS INDEX: 23.04 KG/M2 | SYSTOLIC BLOOD PRESSURE: 110 MMHG | OXYGEN SATURATION: 98 % | TEMPERATURE: 96.9 F | DIASTOLIC BLOOD PRESSURE: 70 MMHG | WEIGHT: 130 LBS | HEIGHT: 63 IN | RESPIRATION RATE: 16 BRPM

## 2024-10-09 DIAGNOSIS — A04.72 ENTEROCOLITIS DUE TO CLOSTRIDIUM DIFFICILE, NOT SPECIFIED AS RECURRENT: ICD-10-CM

## 2024-10-09 PROCEDURE — G2211 COMPLEX E/M VISIT ADD ON: CPT | Mod: NC

## 2024-10-09 PROCEDURE — 99215 OFFICE O/P EST HI 40 MIN: CPT

## 2024-10-09 RX ORDER — BACILLUS COAGULANS/INULIN 1B-250 MG
CAPSULE ORAL
Refills: 0 | Status: ACTIVE | COMMUNITY

## 2024-10-15 ENCOUNTER — TRANSCRIPTION ENCOUNTER (OUTPATIENT)
Age: 54
End: 2024-10-15

## 2024-10-15 RX ORDER — FIDAXOMICIN 200 MG/1
200 TABLET, FILM COATED ORAL TWICE DAILY
Qty: 20 | Refills: 0 | Status: ACTIVE | COMMUNITY
Start: 2024-10-15 | End: 1900-01-01

## 2024-10-16 ENCOUNTER — TRANSCRIPTION ENCOUNTER (OUTPATIENT)
Age: 54
End: 2024-10-16

## 2024-10-23 ENCOUNTER — TRANSCRIPTION ENCOUNTER (OUTPATIENT)
Age: 54
End: 2024-10-23

## 2024-10-23 ENCOUNTER — NON-APPOINTMENT (OUTPATIENT)
Age: 54
End: 2024-10-23

## 2024-10-23 ENCOUNTER — APPOINTMENT (OUTPATIENT)
Dept: GASTROENTEROLOGY | Facility: CLINIC | Age: 54
End: 2024-10-23
Payer: COMMERCIAL

## 2024-10-23 PROCEDURE — 99443: CPT

## 2024-10-28 ENCOUNTER — NON-APPOINTMENT (OUTPATIENT)
Age: 54
End: 2024-10-28

## 2024-10-31 ENCOUNTER — TRANSCRIPTION ENCOUNTER (OUTPATIENT)
Age: 54
End: 2024-10-31

## 2024-11-22 ENCOUNTER — APPOINTMENT (OUTPATIENT)
Dept: OBGYN | Facility: CLINIC | Age: 54
End: 2024-11-22
Payer: COMMERCIAL

## 2024-11-22 VITALS — BODY MASS INDEX: 21.79 KG/M2 | DIASTOLIC BLOOD PRESSURE: 67 MMHG | SYSTOLIC BLOOD PRESSURE: 103 MMHG | WEIGHT: 123 LBS

## 2024-11-22 DIAGNOSIS — Z11.3 ENCOUNTER FOR SCREENING FOR INFECTIONS WITH A PREDOMINANTLY SEXUAL MODE OF TRANSMISSION: ICD-10-CM

## 2024-11-22 DIAGNOSIS — N95.9 UNSPECIFIED MENOPAUSAL AND PERIMENOPAUSAL DISORDER: ICD-10-CM

## 2024-11-22 DIAGNOSIS — Z01.419 ENCOUNTER FOR GYNECOLOGICAL EXAMINATION (GENERAL) (ROUTINE) W/OUT ABNORMAL FINDINGS: ICD-10-CM

## 2024-11-22 PROCEDURE — 99396 PREV VISIT EST AGE 40-64: CPT

## 2024-11-22 PROCEDURE — 99459 PELVIC EXAMINATION: CPT

## 2024-11-22 RX ORDER — ESTRADIOL AND LEVONORGESTREL .045; .015 MG/D; MG/D
0.045-0.015 PATCH TRANSDERMAL
Qty: 1 | Refills: 3 | Status: ACTIVE | COMMUNITY
Start: 2024-11-22 | End: 1900-01-01

## 2024-11-26 LAB
BV BACTERIA RRNA VAG QL NAA+PROBE: NOT DETECTED
C GLABRATA RNA VAG QL NAA+PROBE: NOT DETECTED
C TRACH RRNA SPEC QL NAA+PROBE: NOT DETECTED
CANDIDA RRNA VAG QL PROBE: NOT DETECTED
HBV SURFACE AG SER QL: NONREACTIVE
HCV AB SER QL: NONREACTIVE
HCV S/CO RATIO: 0.1 S/CO
HIV1+2 AB SPEC QL IA.RAPID: NONREACTIVE
HPV HIGH+LOW RISK DNA PNL CVX: NOT DETECTED
N GONORRHOEA RRNA SPEC QL NAA+PROBE: NOT DETECTED
T PALLIDUM AB SER QL IA: NEGATIVE
T VAGINALIS RRNA SPEC QL NAA+PROBE: NOT DETECTED

## 2024-11-28 LAB — CYTOLOGY CVX/VAG DOC THIN PREP: NORMAL

## 2025-01-08 ENCOUNTER — APPOINTMENT (OUTPATIENT)
Dept: GASTROENTEROLOGY | Facility: CLINIC | Age: 55
End: 2025-01-08

## 2025-01-14 ENCOUNTER — APPOINTMENT (OUTPATIENT)
Dept: OBGYN | Facility: CLINIC | Age: 55
End: 2025-01-14
Payer: COMMERCIAL

## 2025-01-14 DIAGNOSIS — N95.9 UNSPECIFIED MENOPAUSAL AND PERIMENOPAUSAL DISORDER: ICD-10-CM

## 2025-01-14 PROCEDURE — 99212 OFFICE O/P EST SF 10 MIN: CPT | Mod: 95

## 2025-01-30 ENCOUNTER — TRANSCRIPTION ENCOUNTER (OUTPATIENT)
Age: 55
End: 2025-01-30

## 2025-01-31 ENCOUNTER — TRANSCRIPTION ENCOUNTER (OUTPATIENT)
Age: 55
End: 2025-01-31

## 2025-02-05 ENCOUNTER — APPOINTMENT (OUTPATIENT)
Dept: ORTHOPEDIC SURGERY | Facility: CLINIC | Age: 55
End: 2025-02-05
Payer: COMMERCIAL

## 2025-02-05 DIAGNOSIS — M25.552 PAIN IN LEFT HIP: ICD-10-CM

## 2025-02-05 DIAGNOSIS — S76.012A STRAIN OF MUSCLE, FASCIA AND TENDON OF LEFT HIP, INITIAL ENCOUNTER: ICD-10-CM

## 2025-02-05 DIAGNOSIS — G89.29 PAIN IN LEFT HIP: ICD-10-CM

## 2025-02-05 PROCEDURE — 99213 OFFICE O/P EST LOW 20 MIN: CPT

## 2025-02-08 ENCOUNTER — APPOINTMENT (OUTPATIENT)
Dept: MRI IMAGING | Facility: CLINIC | Age: 55
End: 2025-02-08

## 2025-02-08 ENCOUNTER — OUTPATIENT (OUTPATIENT)
Dept: OUTPATIENT SERVICES | Facility: HOSPITAL | Age: 55
LOS: 1 days | End: 2025-02-08

## 2025-02-08 PROCEDURE — 73721 MRI JNT OF LWR EXTRE W/O DYE: CPT | Mod: 26,LT

## 2025-02-11 DIAGNOSIS — M79.9 SOFT TISSUE DISORDER, UNSPECIFIED: ICD-10-CM

## 2025-02-18 ENCOUNTER — APPOINTMENT (OUTPATIENT)
Dept: ULTRASOUND IMAGING | Facility: CLINIC | Age: 55
End: 2025-02-18
Payer: COMMERCIAL

## 2025-02-18 ENCOUNTER — OUTPATIENT (OUTPATIENT)
Dept: OUTPATIENT SERVICES | Facility: HOSPITAL | Age: 55
LOS: 1 days | End: 2025-02-18

## 2025-02-18 ENCOUNTER — RESULT REVIEW (OUTPATIENT)
Age: 55
End: 2025-02-18

## 2025-02-18 PROCEDURE — 76856 US EXAM PELVIC COMPLETE: CPT | Mod: 26

## 2025-02-18 PROCEDURE — 76830 TRANSVAGINAL US NON-OB: CPT | Mod: 26

## 2025-02-19 ENCOUNTER — TRANSCRIPTION ENCOUNTER (OUTPATIENT)
Age: 55
End: 2025-02-19

## 2025-02-25 ENCOUNTER — APPOINTMENT (OUTPATIENT)
Dept: FAMILY MEDICINE | Facility: CLINIC | Age: 55
End: 2025-02-25
Payer: COMMERCIAL

## 2025-02-25 ENCOUNTER — TRANSCRIPTION ENCOUNTER (OUTPATIENT)
Age: 55
End: 2025-02-25

## 2025-02-25 VITALS
TEMPERATURE: 97.9 F | OXYGEN SATURATION: 96 % | WEIGHT: 122 LBS | HEART RATE: 70 BPM | DIASTOLIC BLOOD PRESSURE: 76 MMHG | BODY MASS INDEX: 21.62 KG/M2 | SYSTOLIC BLOOD PRESSURE: 124 MMHG | HEIGHT: 63 IN

## 2025-02-25 DIAGNOSIS — N83.202 UNSPECIFIED OVARIAN CYST, LEFT SIDE: ICD-10-CM

## 2025-02-25 DIAGNOSIS — Z00.00 ENCOUNTER FOR GENERAL ADULT MEDICAL EXAMINATION W/OUT ABNORMAL FINDINGS: ICD-10-CM

## 2025-02-25 PROCEDURE — 93000 ELECTROCARDIOGRAM COMPLETE: CPT

## 2025-02-25 PROCEDURE — 36415 COLL VENOUS BLD VENIPUNCTURE: CPT

## 2025-02-25 PROCEDURE — 99396 PREV VISIT EST AGE 40-64: CPT

## 2025-02-26 ENCOUNTER — TRANSCRIPTION ENCOUNTER (OUTPATIENT)
Age: 55
End: 2025-02-26

## 2025-02-26 ENCOUNTER — NON-APPOINTMENT (OUTPATIENT)
Age: 55
End: 2025-02-26

## 2025-02-27 LAB
25(OH)D3 SERPL-MCNC: 36.1 NG/ML
ALBUMIN SERPL ELPH-MCNC: 4.6 G/DL
ALP BLD-CCNC: 38 U/L
ALT SERPL-CCNC: 24 U/L
ANION GAP SERPL CALC-SCNC: 11 MMOL/L
APPEARANCE: CLEAR
AST SERPL-CCNC: 23 U/L
BACTERIA: NEGATIVE /HPF
BASOPHILS # BLD AUTO: 0.06 K/UL
BASOPHILS NFR BLD AUTO: 0.8 %
BILIRUB SERPL-MCNC: 0.4 MG/DL
BILIRUBIN URINE: NEGATIVE
BLOOD URINE: NEGATIVE
BUN SERPL-MCNC: 21 MG/DL
C TRACH RRNA SPEC QL NAA+PROBE: NOT DETECTED
CALCIUM SERPL-MCNC: 9.4 MG/DL
CAST: 0 /LPF
CHLORIDE SERPL-SCNC: 102 MMOL/L
CHOLEST SERPL-MCNC: 139 MG/DL
CO2 SERPL-SCNC: 24 MMOL/L
COLOR: YELLOW
CREAT SERPL-MCNC: 0.71 MG/DL
EGFR: 102 ML/MIN/1.73M2
EOSINOPHIL # BLD AUTO: 0.09 K/UL
EOSINOPHIL NFR BLD AUTO: 1.2 %
EPITHELIAL CELLS: 1 /HPF
ESTIMATED AVERAGE GLUCOSE: 105 MG/DL
FERRITIN SERPL-MCNC: 75 NG/ML
FOLATE SERPL-MCNC: 12.3 NG/ML
GLUCOSE QUALITATIVE U: NEGATIVE MG/DL
GLUCOSE SERPL-MCNC: 132 MG/DL
HAV IGM SER QL: NONREACTIVE
HBA1C MFR BLD HPLC: 5.3 %
HBV CORE IGM SER QL: NONREACTIVE
HBV SURFACE AG SER QL: NONREACTIVE
HCT VFR BLD CALC: 33.9 %
HCV AB SER QL: NONREACTIVE
HCV S/CO RATIO: 0.09 S/CO
HDLC SERPL-MCNC: 66 MG/DL
HGB BLD-MCNC: 11.2 G/DL
HIV1+2 AB SPEC QL IA.RAPID: NONREACTIVE
IMM GRANULOCYTES NFR BLD AUTO: 0.3 %
IRON SATN MFR SERPL: 30 %
IRON SERPL-MCNC: 88 UG/DL
KETONES URINE: NEGATIVE MG/DL
LDLC SERPL CALC-MCNC: 64 MG/DL
LEUKOCYTE ESTERASE URINE: NEGATIVE
LYMPHOCYTES # BLD AUTO: 1.94 K/UL
LYMPHOCYTES NFR BLD AUTO: 26.4 %
MAN DIFF?: NORMAL
MCHC RBC-ENTMCNC: 32.7 PG
MCHC RBC-ENTMCNC: 33 G/DL
MCV RBC AUTO: 99.1 FL
MICROSCOPIC-UA: NORMAL
MONOCYTES # BLD AUTO: 0.62 K/UL
MONOCYTES NFR BLD AUTO: 8.4 %
N GONORRHOEA RRNA SPEC QL NAA+PROBE: NOT DETECTED
NEUTROPHILS # BLD AUTO: 4.63 K/UL
NEUTROPHILS NFR BLD AUTO: 62.9 %
NITRITE URINE: NEGATIVE
NONHDLC SERPL-MCNC: 73 MG/DL
PH URINE: 5.5
PLATELET # BLD AUTO: 313 K/UL
POTASSIUM SERPL-SCNC: 4.5 MMOL/L
PROT SERPL-MCNC: 7.1 G/DL
PROTEIN URINE: NEGATIVE MG/DL
RBC # BLD: 3.42 M/UL
RBC # FLD: 12.2 %
RED BLOOD CELLS URINE: 0 /HPF
SODIUM SERPL-SCNC: 138 MMOL/L
SOURCE AMPLIFICATION: NORMAL
SPECIFIC GRAVITY URINE: 1.02
T PALLIDUM AB SER QL IA: NEGATIVE
T4 FREE SERPL-MCNC: 1 NG/DL
TIBC SERPL-MCNC: 298 UG/DL
TRANSFERRIN SERPL-MCNC: 258 MG/DL
TRIGL SERPL-MCNC: 37 MG/DL
TSH SERPL-ACNC: 2.79 UIU/ML
UIBC SERPL-MCNC: 210 UG/DL
UROBILINOGEN URINE: 0.2 MG/DL
VIT B12 SERPL-MCNC: 502 PG/ML
WBC # FLD AUTO: 7.36 K/UL
WHITE BLOOD CELLS URINE: 0 /HPF

## 2025-03-03 ENCOUNTER — NON-APPOINTMENT (OUTPATIENT)
Age: 55
End: 2025-03-03

## 2025-03-04 ENCOUNTER — APPOINTMENT (OUTPATIENT)
Dept: PHYSICAL MEDICINE AND REHAB | Facility: CLINIC | Age: 55
End: 2025-03-04

## 2025-03-06 ENCOUNTER — TRANSCRIPTION ENCOUNTER (OUTPATIENT)
Age: 55
End: 2025-03-06

## 2025-03-07 ENCOUNTER — TRANSCRIPTION ENCOUNTER (OUTPATIENT)
Age: 55
End: 2025-03-07

## 2025-03-14 ENCOUNTER — APPOINTMENT (OUTPATIENT)
Dept: OBGYN | Facility: CLINIC | Age: 55
End: 2025-03-14
Payer: COMMERCIAL

## 2025-03-14 DIAGNOSIS — N83.202 UNSPECIFIED OVARIAN CYST, LEFT SIDE: ICD-10-CM

## 2025-03-14 PROCEDURE — 99213 OFFICE O/P EST LOW 20 MIN: CPT | Mod: 95

## 2025-03-23 LAB
CANCER AG125 SERPL-ACNC: 13 U/ML
CEA SERPL-MCNC: 0.8 NG/ML

## 2025-03-25 ENCOUNTER — TRANSCRIPTION ENCOUNTER (OUTPATIENT)
Age: 55
End: 2025-03-25

## 2025-03-31 ENCOUNTER — TRANSCRIPTION ENCOUNTER (OUTPATIENT)
Age: 55
End: 2025-03-31

## 2025-03-31 ENCOUNTER — NON-APPOINTMENT (OUTPATIENT)
Age: 55
End: 2025-03-31

## 2025-04-12 ENCOUNTER — OUTPATIENT (OUTPATIENT)
Dept: OUTPATIENT SERVICES | Facility: HOSPITAL | Age: 55
LOS: 1 days | End: 2025-04-12

## 2025-04-15 ENCOUNTER — OUTPATIENT (OUTPATIENT)
Dept: OUTPATIENT SERVICES | Facility: HOSPITAL | Age: 55
LOS: 1 days | End: 2025-04-15

## 2025-04-15 ENCOUNTER — RESULT REVIEW (OUTPATIENT)
Age: 55
End: 2025-04-15

## 2025-04-15 ENCOUNTER — APPOINTMENT (OUTPATIENT)
Dept: ULTRASOUND IMAGING | Facility: CLINIC | Age: 55
End: 2025-04-15
Payer: COMMERCIAL

## 2025-04-15 PROCEDURE — 76856 US EXAM PELVIC COMPLETE: CPT | Mod: 26

## 2025-04-15 PROCEDURE — 76830 TRANSVAGINAL US NON-OB: CPT | Mod: 26

## 2025-04-18 ENCOUNTER — TRANSCRIPTION ENCOUNTER (OUTPATIENT)
Age: 55
End: 2025-04-18

## 2025-04-22 ENCOUNTER — APPOINTMENT (OUTPATIENT)
Dept: FAMILY MEDICINE | Facility: CLINIC | Age: 55
End: 2025-04-22
Payer: COMMERCIAL

## 2025-04-22 VITALS
DIASTOLIC BLOOD PRESSURE: 66 MMHG | SYSTOLIC BLOOD PRESSURE: 99 MMHG | HEIGHT: 63 IN | WEIGHT: 121 LBS | OXYGEN SATURATION: 97 % | BODY MASS INDEX: 21.44 KG/M2 | TEMPERATURE: 97.2 F | HEART RATE: 53 BPM

## 2025-04-22 DIAGNOSIS — E53.8 DEFICIENCY OF OTHER SPECIFIED B GROUP VITAMINS: ICD-10-CM

## 2025-04-22 DIAGNOSIS — N83.202 UNSPECIFIED OVARIAN CYST, LEFT SIDE: ICD-10-CM

## 2025-04-22 DIAGNOSIS — G89.29 PAIN IN LEFT HIP: ICD-10-CM

## 2025-04-22 DIAGNOSIS — M25.552 PAIN IN LEFT HIP: ICD-10-CM

## 2025-04-22 DIAGNOSIS — K76.0 FATTY (CHANGE OF) LIVER, NOT ELSEWHERE CLASSIFIED: ICD-10-CM

## 2025-04-22 DIAGNOSIS — D64.9 ANEMIA, UNSPECIFIED: ICD-10-CM

## 2025-04-22 PROCEDURE — 99214 OFFICE O/P EST MOD 30 MIN: CPT | Mod: 25

## 2025-04-22 PROCEDURE — 36415 COLL VENOUS BLD VENIPUNCTURE: CPT

## 2025-04-22 PROCEDURE — 96372 THER/PROPH/DIAG INJ SC/IM: CPT

## 2025-04-22 RX ORDER — MINOXIDIL 2.5 MG/1
2.5 TABLET ORAL DAILY
Qty: 30 | Refills: 0 | Status: ACTIVE | COMMUNITY
Start: 2025-04-22

## 2025-04-22 RX ORDER — CYANOCOBALAMIN 1000 UG/ML
1000 INJECTION, SOLUTION INTRAMUSCULAR; SUBCUTANEOUS
Qty: 0 | Refills: 0 | Status: COMPLETED | OUTPATIENT
Start: 2025-04-22

## 2025-04-22 RX ADMIN — CYANOCOBALAMIN 0 MCG/ML: 1000 INJECTION, SOLUTION INTRAMUSCULAR at 00:00

## 2025-04-23 ENCOUNTER — NON-APPOINTMENT (OUTPATIENT)
Age: 55
End: 2025-04-23

## 2025-04-23 LAB
ALBUMIN SERPL ELPH-MCNC: 4.4 G/DL
ALP BLD-CCNC: 41 U/L
ALT SERPL-CCNC: 18 U/L
ANION GAP SERPL CALC-SCNC: 12 MMOL/L
AST SERPL-CCNC: 20 U/L
BASOPHILS # BLD AUTO: 0.05 K/UL
BASOPHILS NFR BLD AUTO: 0.6 %
BILIRUB SERPL-MCNC: 0.4 MG/DL
BUN SERPL-MCNC: 16 MG/DL
CALCIUM SERPL-MCNC: 9.4 MG/DL
CHLORIDE SERPL-SCNC: 103 MMOL/L
CO2 SERPL-SCNC: 22 MMOL/L
CREAT SERPL-MCNC: 0.82 MG/DL
EGFRCR SERPLBLD CKD-EPI 2021: 86 ML/MIN/1.73M2
EOSINOPHIL # BLD AUTO: 0.08 K/UL
EOSINOPHIL NFR BLD AUTO: 1 %
FERRITIN SERPL-MCNC: 36 NG/ML
FOLATE SERPL-MCNC: 14.2 NG/ML
GLUCOSE SERPL-MCNC: 111 MG/DL
HCT VFR BLD CALC: 36.6 %
HGB BLD-MCNC: 11.7 G/DL
IMM GRANULOCYTES NFR BLD AUTO: 0.2 %
IRON SATN MFR SERPL: 29 %
IRON SERPL-MCNC: 91 UG/DL
LYMPHOCYTES # BLD AUTO: 1.67 K/UL
LYMPHOCYTES NFR BLD AUTO: 20.5 %
MAN DIFF?: NORMAL
MCHC RBC-ENTMCNC: 32 G/DL
MCHC RBC-ENTMCNC: 32.5 PG
MCV RBC AUTO: 101.7 FL
MONOCYTES # BLD AUTO: 0.48 K/UL
MONOCYTES NFR BLD AUTO: 5.9 %
NEUTROPHILS # BLD AUTO: 5.84 K/UL
NEUTROPHILS NFR BLD AUTO: 71.8 %
PLATELET # BLD AUTO: 313 K/UL
POTASSIUM SERPL-SCNC: 4.9 MMOL/L
PROT SERPL-MCNC: 7 G/DL
RBC # BLD: 3.6 M/UL
RBC # FLD: 12.1 %
SODIUM SERPL-SCNC: 137 MMOL/L
TIBC SERPL-MCNC: 311 UG/DL
TRANSFERRIN SERPL-MCNC: 251 MG/DL
UIBC SERPL-MCNC: 220 UG/DL
VIT B12 SERPL-MCNC: 1421 PG/ML
WBC # FLD AUTO: 8.14 K/UL

## 2025-05-09 ENCOUNTER — TRANSCRIPTION ENCOUNTER (OUTPATIENT)
Age: 55
End: 2025-05-09

## 2025-05-17 ENCOUNTER — NON-APPOINTMENT (OUTPATIENT)
Age: 55
End: 2025-05-17

## 2025-05-22 ENCOUNTER — APPOINTMENT (OUTPATIENT)
Dept: PHYSICAL MEDICINE AND REHAB | Facility: CLINIC | Age: 55
End: 2025-05-22
Payer: COMMERCIAL

## 2025-05-22 VITALS
HEART RATE: 63 BPM | WEIGHT: 122 LBS | BODY MASS INDEX: 21.62 KG/M2 | SYSTOLIC BLOOD PRESSURE: 109 MMHG | DIASTOLIC BLOOD PRESSURE: 61 MMHG | HEIGHT: 63 IN | RESPIRATION RATE: 18 BRPM

## 2025-05-22 DIAGNOSIS — M24.80 OTHER SPECIFIC JOINT DERANGEMENTS OF UNSPECIFIED JOINT, NOT ELSEWHERE CLASSIFIED: ICD-10-CM

## 2025-05-22 DIAGNOSIS — M54.9 DORSALGIA, UNSPECIFIED: ICD-10-CM

## 2025-05-22 DIAGNOSIS — M62.830 MUSCLE SPASM OF BACK: ICD-10-CM

## 2025-05-22 DIAGNOSIS — R52 PAIN, UNSPECIFIED: ICD-10-CM

## 2025-05-22 DIAGNOSIS — M54.10 RADICULOPATHY, SITE UNSPECIFIED: ICD-10-CM

## 2025-05-22 PROCEDURE — 99204 OFFICE O/P NEW MOD 45 MIN: CPT

## 2025-05-22 RX ORDER — METHYLPREDNISOLONE 4 MG/1
4 TABLET ORAL
Qty: 1 | Refills: 1 | Status: ACTIVE | COMMUNITY
Start: 2025-05-22 | End: 1900-01-01

## 2025-05-31 ENCOUNTER — APPOINTMENT (OUTPATIENT)
Dept: RADIOLOGY | Facility: CLINIC | Age: 55
End: 2025-05-31

## 2025-05-31 ENCOUNTER — OUTPATIENT (OUTPATIENT)
Age: 55
LOS: 1 days | End: 2025-05-31
Payer: COMMERCIAL

## 2025-05-31 PROCEDURE — 72110 X-RAY EXAM L-2 SPINE 4/>VWS: CPT | Mod: 26

## 2025-06-02 ENCOUNTER — RX RENEWAL (OUTPATIENT)
Age: 55
End: 2025-06-02

## 2025-06-02 ENCOUNTER — TRANSCRIPTION ENCOUNTER (OUTPATIENT)
Age: 55
End: 2025-06-02

## 2025-06-04 ENCOUNTER — TRANSCRIPTION ENCOUNTER (OUTPATIENT)
Age: 55
End: 2025-06-04

## 2025-06-13 ENCOUNTER — TRANSCRIPTION ENCOUNTER (OUTPATIENT)
Age: 55
End: 2025-06-13

## 2025-06-18 ENCOUNTER — APPOINTMENT (OUTPATIENT)
Dept: OBGYN | Facility: CLINIC | Age: 55
End: 2025-06-18
Payer: COMMERCIAL

## 2025-06-18 VITALS
SYSTOLIC BLOOD PRESSURE: 110 MMHG | OXYGEN SATURATION: 99 % | HEART RATE: 55 BPM | WEIGHT: 125 LBS | BODY MASS INDEX: 22.14 KG/M2 | DIASTOLIC BLOOD PRESSURE: 70 MMHG

## 2025-06-18 PROBLEM — N92.6 IRREGULAR MENSTRUAL BLEEDING: Status: ACTIVE | Noted: 2025-06-18

## 2025-06-18 PROCEDURE — 99213 OFFICE O/P EST LOW 20 MIN: CPT | Mod: 25

## 2025-06-18 PROCEDURE — 58100 BIOPSY OF UTERUS LINING: CPT

## 2025-06-23 ENCOUNTER — TRANSCRIPTION ENCOUNTER (OUTPATIENT)
Age: 55
End: 2025-06-23

## 2025-06-24 ENCOUNTER — APPOINTMENT (OUTPATIENT)
Dept: PHYSICAL MEDICINE AND REHAB | Facility: CLINIC | Age: 55
End: 2025-06-24

## 2025-06-24 ENCOUNTER — TRANSCRIPTION ENCOUNTER (OUTPATIENT)
Age: 55
End: 2025-06-24

## 2025-06-24 LAB — CORE LAB BIOPSY: NORMAL

## 2025-06-24 RX ORDER — ESTRADIOL AND NORETHINDRONE ACETATE .5; .1 MG/1; MG/1
0.5-0.1 TABLET, FILM COATED ORAL DAILY
Qty: 4 | Refills: 3 | Status: ACTIVE | COMMUNITY
Start: 2025-06-24 | End: 1900-01-01

## 2025-06-25 ENCOUNTER — TRANSCRIPTION ENCOUNTER (OUTPATIENT)
Age: 55
End: 2025-06-25

## 2025-07-14 ENCOUNTER — TRANSCRIPTION ENCOUNTER (OUTPATIENT)
Age: 55
End: 2025-07-14

## 2025-07-14 ENCOUNTER — RX RENEWAL (OUTPATIENT)
Age: 55
End: 2025-07-14

## 2025-07-28 ENCOUNTER — APPOINTMENT (OUTPATIENT)
Dept: GASTROENTEROLOGY | Facility: CLINIC | Age: 55
End: 2025-07-28

## 2025-09-03 ENCOUNTER — TRANSCRIPTION ENCOUNTER (OUTPATIENT)
Age: 55
End: 2025-09-03

## 2025-09-15 ENCOUNTER — APPOINTMENT (OUTPATIENT)
Dept: UROGYNECOLOGY | Facility: CLINIC | Age: 55
End: 2025-09-15